# Patient Record
Sex: MALE | Race: OTHER | NOT HISPANIC OR LATINO | Employment: OTHER | URBAN - METROPOLITAN AREA
[De-identification: names, ages, dates, MRNs, and addresses within clinical notes are randomized per-mention and may not be internally consistent; named-entity substitution may affect disease eponyms.]

---

## 2023-03-07 ENCOUNTER — APPOINTMENT (OUTPATIENT)
Dept: LAB | Facility: LAB | Age: 83
End: 2023-03-07
Payer: MEDICARE

## 2023-03-07 LAB
DEAMIDATED GLIADIN PEPTIDE IGA: <1 U/ML (ref 0–14)
DEAMIDATED GLIADIN PEPTIDE IGG: <1 U/ML (ref 0–14)
TISSUE TRANSGLUTAMINASE IGG: <1 U/ML (ref 0–14)
TISSUE TRANSGLUTAMINASE, IGA: <1 U/ML (ref 0–14)

## 2023-03-08 LAB
ANION GAP IN SER/PLAS: 9 MMOL/L (ref 10–20)
CALCIUM (MG/DL) IN SER/PLAS: 8.8 MG/DL (ref 8.6–10.3)
CARBON DIOXIDE, TOTAL (MMOL/L) IN SER/PLAS: 27 MMOL/L (ref 21–32)
CHLORIDE (MMOL/L) IN SER/PLAS: 100 MMOL/L (ref 98–107)
CREATININE (MG/DL) IN SER/PLAS: 1.08 MG/DL (ref 0.5–1.3)
GFR MALE: 68 ML/MIN/1.73M2
GLUCOSE (MG/DL) IN SER/PLAS: 87 MG/DL (ref 74–99)
POTASSIUM (MMOL/L) IN SER/PLAS: 4.3 MMOL/L (ref 3.5–5.3)
SODIUM (MMOL/L) IN SER/PLAS: 132 MMOL/L (ref 136–145)
UREA NITROGEN (MG/DL) IN SER/PLAS: 11 MG/DL (ref 6–23)

## 2023-03-20 LAB
ANION GAP IN SER/PLAS: 10 MMOL/L (ref 10–20)
CALCIUM (MG/DL) IN SER/PLAS: 9.2 MG/DL (ref 8.6–10.3)
CARBON DIOXIDE, TOTAL (MMOL/L) IN SER/PLAS: 26 MMOL/L (ref 21–32)
CHLORIDE (MMOL/L) IN SER/PLAS: 98 MMOL/L (ref 98–107)
CHLORIDE (MOL/L) IN SPOT URINE: 85 MMOL/L
CHLORIDE/CREATININE (MMOL/G) IN URINE: 56 MMOL/G CREAT (ref 23–275)
CORTISOL (UG/DL) IN SERUM - AM: 9.2 UG/DL (ref 5–20)
CREATININE (MG/DL) IN SER/PLAS: 1.06 MG/DL (ref 0.5–1.3)
CREATININE (MG/DL) IN URINE: 152 MG/DL (ref 20–370)
GFR MALE: 70 ML/MIN/1.73M2
GLUCOSE (MG/DL) IN SER/PLAS: 95 MG/DL (ref 74–99)
NATRIURETIC PEPTIDE B (PG/ML) IN SER/PLAS: 107 PG/ML (ref 0–99)
OSMOLALITY, RANDOM URINE: 472 MOSM/KG (ref 200–1200)
POTASSIUM (MMOL/L) IN SER/PLAS: 4 MMOL/L (ref 3.5–5.3)
POTASSIUM URINE RANDOM: 46 MMOL/L
POTASSIUM/CREATININE (MMOL/G) IN URINE: 30 MMOL/G CREAT
SODIUM (MMOL/L) IN SER/PLAS: 130 MMOL/L (ref 136–145)
SODIUM URINE RANDOM: 69 MMOL/L
SODIUM/CREATININE (MMOL/G) IN URINE: 45 MMOL/G CREAT
UREA NITROGEN (MG/DL) IN SER/PLAS: 11 MG/DL (ref 6–23)
UREA NITROGEN, RANDOM URINE: 544 MG/DL
UREA NITROGEN/CREATININE (G/G) URINE: 3.6 G/G CREAT

## 2023-04-19 LAB
ANION GAP IN SER/PLAS: 9 MMOL/L (ref 10–20)
CALCIUM (MG/DL) IN SER/PLAS: 8.9 MG/DL (ref 8.6–10.3)
CARBON DIOXIDE, TOTAL (MMOL/L) IN SER/PLAS: 26 MMOL/L (ref 21–32)
CHLORIDE (MMOL/L) IN SER/PLAS: 101 MMOL/L (ref 98–107)
CREATININE (MG/DL) IN SER/PLAS: 1.01 MG/DL (ref 0.5–1.3)
GFR MALE: 74 ML/MIN/1.73M2
GLUCOSE (MG/DL) IN SER/PLAS: 102 MG/DL (ref 74–99)
POTASSIUM (MMOL/L) IN SER/PLAS: 4.3 MMOL/L (ref 3.5–5.3)
SODIUM (MMOL/L) IN SER/PLAS: 132 MMOL/L (ref 136–145)
UREA NITROGEN (MG/DL) IN SER/PLAS: 12 MG/DL (ref 6–23)

## 2023-05-24 DIAGNOSIS — N39.0 URINARY TRACT INFECTION WITHOUT HEMATURIA, SITE UNSPECIFIED: ICD-10-CM

## 2023-05-25 ENCOUNTER — LAB (OUTPATIENT)
Dept: LAB | Facility: LAB | Age: 83
End: 2023-05-25
Payer: MEDICARE

## 2023-05-25 DIAGNOSIS — N39.0 URINARY TRACT INFECTION WITHOUT HEMATURIA, SITE UNSPECIFIED: ICD-10-CM

## 2023-05-25 LAB
ESTIMATED AVERAGE GLUCOSE FOR HBA1C: 120 MG/DL
HEMOGLOBIN A1C/HEMOGLOBIN TOTAL IN BLOOD: 5.8 %
THYROTROPIN (MIU/L) IN SER/PLAS BY DETECTION LIMIT <= 0.05 MIU/L: 2.62 MIU/L (ref 0.44–3.98)

## 2023-05-25 PROCEDURE — 81003 URINALYSIS AUTO W/O SCOPE: CPT

## 2023-05-26 LAB
APPEARANCE, URINE: CLEAR
BILIRUBIN, URINE: NEGATIVE
BLOOD, URINE: NEGATIVE
COLOR, URINE: ABNORMAL
GLUCOSE, URINE: NEGATIVE MG/DL
KETONES, URINE: NEGATIVE MG/DL
LEUKOCYTE ESTERASE, URINE: NEGATIVE
NITRITE, URINE: NEGATIVE
PH, URINE: 6 (ref 5–8)
PROTEIN, URINE: NEGATIVE MG/DL
SPECIFIC GRAVITY, URINE: 1 (ref 1–1.03)
UROBILINOGEN, URINE: <2 MG/DL (ref 0–1.9)

## 2023-05-26 PROCEDURE — 87086 URINE CULTURE/COLONY COUNT: CPT

## 2023-05-27 LAB — URINE CULTURE: NORMAL

## 2023-05-30 LAB
ANION GAP IN SER/PLAS: 8 MMOL/L (ref 10–20)
CALCIUM (MG/DL) IN SER/PLAS: 9 MG/DL (ref 8.6–10.3)
CARBON DIOXIDE, TOTAL (MMOL/L) IN SER/PLAS: 29 MMOL/L (ref 21–32)
CHLORIDE (MMOL/L) IN SER/PLAS: 100 MMOL/L (ref 98–107)
CREATININE (MG/DL) IN SER/PLAS: 0.98 MG/DL (ref 0.5–1.3)
GFR MALE: 76 ML/MIN/1.73M2
GLUCOSE (MG/DL) IN SER/PLAS: 99 MG/DL (ref 74–99)
POTASSIUM (MMOL/L) IN SER/PLAS: 4.5 MMOL/L (ref 3.5–5.3)
SODIUM (MMOL/L) IN SER/PLAS: 132 MMOL/L (ref 136–145)
UREA NITROGEN (MG/DL) IN SER/PLAS: 12 MG/DL (ref 6–23)

## 2023-06-12 LAB
ANION GAP IN SER/PLAS: 10 MMOL/L (ref 10–20)
CALCIUM (MG/DL) IN SER/PLAS: 8.8 MG/DL (ref 8.6–10.3)
CARBON DIOXIDE, TOTAL (MMOL/L) IN SER/PLAS: 28 MMOL/L (ref 21–32)
CHLORIDE (MMOL/L) IN SER/PLAS: 102 MMOL/L (ref 98–107)
CREATININE (MG/DL) IN SER/PLAS: 1.07 MG/DL (ref 0.5–1.3)
GFR MALE: 69 ML/MIN/1.73M2
GLUCOSE (MG/DL) IN SER/PLAS: 93 MG/DL (ref 74–99)
POTASSIUM (MMOL/L) IN SER/PLAS: 4.5 MMOL/L (ref 3.5–5.3)
SODIUM (MMOL/L) IN SER/PLAS: 135 MMOL/L (ref 136–145)
UREA NITROGEN (MG/DL) IN SER/PLAS: 16 MG/DL (ref 6–23)

## 2023-06-15 DIAGNOSIS — E06.3 HYPOTHYROIDISM DUE TO HASHIMOTO'S THYROIDITIS: Primary | ICD-10-CM

## 2023-06-15 DIAGNOSIS — E03.8 HYPOTHYROIDISM DUE TO HASHIMOTO'S THYROIDITIS: Primary | ICD-10-CM

## 2023-06-16 ENCOUNTER — OFFICE VISIT (OUTPATIENT)
Dept: PRIMARY CARE | Facility: CLINIC | Age: 83
End: 2023-06-16
Payer: MEDICARE

## 2023-06-16 VITALS
SYSTOLIC BLOOD PRESSURE: 130 MMHG | BODY MASS INDEX: 28.63 KG/M2 | OXYGEN SATURATION: 99 % | WEIGHT: 188.27 LBS | DIASTOLIC BLOOD PRESSURE: 64 MMHG | HEART RATE: 54 BPM

## 2023-06-16 DIAGNOSIS — R06.09 DYSPNEA ON EXERTION: ICD-10-CM

## 2023-06-16 DIAGNOSIS — K59.04 CHRONIC IDIOPATHIC CONSTIPATION: ICD-10-CM

## 2023-06-16 DIAGNOSIS — E03.8 HYPOTHYROIDISM DUE TO HASHIMOTO'S THYROIDITIS: ICD-10-CM

## 2023-06-16 DIAGNOSIS — I10 ESSENTIAL HYPERTENSION: Primary | ICD-10-CM

## 2023-06-16 DIAGNOSIS — E06.3 HYPOTHYROIDISM DUE TO HASHIMOTO'S THYROIDITIS: ICD-10-CM

## 2023-06-16 DIAGNOSIS — E87.1 HYPONATREMIA: ICD-10-CM

## 2023-06-16 PROBLEM — E78.5 HYPERLIPIDEMIA: Status: ACTIVE | Noted: 2023-06-16

## 2023-06-16 PROBLEM — K59.00 CONSTIPATION: Status: ACTIVE | Noted: 2023-06-16

## 2023-06-16 PROBLEM — R53.82 CHRONIC FATIGUE: Status: ACTIVE | Noted: 2023-06-16

## 2023-06-16 PROBLEM — I69.322 DYSARTHRIA FOLLOWING CEREBROVASCULAR ACCIDENT: Status: ACTIVE | Noted: 2023-06-16

## 2023-06-16 PROBLEM — J45.909 REACTIVE AIRWAY DISEASE (HHS-HCC): Status: ACTIVE | Noted: 2023-06-16

## 2023-06-16 PROBLEM — R41.3 MEMORY LOSS: Status: ACTIVE | Noted: 2023-06-16

## 2023-06-16 PROBLEM — M54.50 LUMBAR PAIN: Status: ACTIVE | Noted: 2023-06-16

## 2023-06-16 PROBLEM — K21.9 GERD WITHOUT ESOPHAGITIS: Status: ACTIVE | Noted: 2023-06-16

## 2023-06-16 PROBLEM — Z95.1 S/P CABG (CORONARY ARTERY BYPASS GRAFT): Status: ACTIVE | Noted: 2023-06-16

## 2023-06-16 PROBLEM — I63.9 CEREBRAL INFARCTION, UNSPECIFIED (MULTI): Status: ACTIVE | Noted: 2023-06-16

## 2023-06-16 PROBLEM — H49.21 CN VI PALSY, RIGHT EYE: Status: ACTIVE | Noted: 2023-06-16

## 2023-06-16 PROBLEM — I73.9 INTERMITTENT CLAUDICATION (CMS-HCC): Status: ACTIVE | Noted: 2023-06-16

## 2023-06-16 PROCEDURE — 3075F SYST BP GE 130 - 139MM HG: CPT | Performed by: INTERNAL MEDICINE

## 2023-06-16 PROCEDURE — 3078F DIAST BP <80 MM HG: CPT | Performed by: INTERNAL MEDICINE

## 2023-06-16 PROCEDURE — 1036F TOBACCO NON-USER: CPT | Performed by: INTERNAL MEDICINE

## 2023-06-16 PROCEDURE — 99214 OFFICE O/P EST MOD 30 MIN: CPT | Performed by: INTERNAL MEDICINE

## 2023-06-16 NOTE — ASSESSMENT & PLAN NOTE
Symptoms are mild but the patient does have some minimal crackles in his bases so we will obtain a chest x-ray to look for any pulmonary congestion.

## 2023-06-16 NOTE — ASSESSMENT & PLAN NOTE
Unclear etiology for his fatigue.  We will get him into a more aggressive exercise program.  We will assess him after he gets more definitive treatment for his urologic issues and hopefully will get a better night sleep.  We will check a CBC chemistries and thyroid function studies today as well.

## 2023-06-17 ENCOUNTER — LAB (OUTPATIENT)
Dept: LAB | Facility: LAB | Age: 83
End: 2023-06-17
Payer: MEDICARE

## 2023-06-17 DIAGNOSIS — E06.3 HYPOTHYROIDISM DUE TO HASHIMOTO'S THYROIDITIS: ICD-10-CM

## 2023-06-17 DIAGNOSIS — E03.8 HYPOTHYROIDISM DUE TO HASHIMOTO'S THYROIDITIS: ICD-10-CM

## 2023-06-17 LAB
ALANINE AMINOTRANSFERASE (SGPT) (U/L) IN SER/PLAS: 27 U/L (ref 10–52)
ALBUMIN (G/DL) IN SER/PLAS: 4 G/DL (ref 3.4–5)
ALKALINE PHOSPHATASE (U/L) IN SER/PLAS: 112 U/L (ref 33–136)
ANION GAP IN SER/PLAS: 10 MMOL/L (ref 10–20)
ASPARTATE AMINOTRANSFERASE (SGOT) (U/L) IN SER/PLAS: 32 U/L (ref 9–39)
BASOPHILS (10*3/UL) IN BLOOD BY AUTOMATED COUNT: 0.04 X10E9/L (ref 0–0.1)
BASOPHILS/100 LEUKOCYTES IN BLOOD BY AUTOMATED COUNT: 0.7 % (ref 0–2)
BILIRUBIN TOTAL (MG/DL) IN SER/PLAS: 0.9 MG/DL (ref 0–1.2)
CALCIUM (MG/DL) IN SER/PLAS: 9 MG/DL (ref 8.6–10.3)
CARBON DIOXIDE, TOTAL (MMOL/L) IN SER/PLAS: 27 MMOL/L (ref 21–32)
CHLORIDE (MMOL/L) IN SER/PLAS: 101 MMOL/L (ref 98–107)
CHOLESTEROL (MG/DL) IN SER/PLAS: 107 MG/DL (ref 0–199)
CHOLESTEROL IN HDL (MG/DL) IN SER/PLAS: 51.2 MG/DL
CHOLESTEROL/HDL RATIO: 2.1
CREATININE (MG/DL) IN SER/PLAS: 1.05 MG/DL (ref 0.5–1.3)
EOSINOPHILS (10*3/UL) IN BLOOD BY AUTOMATED COUNT: 0.1 X10E9/L (ref 0–0.4)
EOSINOPHILS/100 LEUKOCYTES IN BLOOD BY AUTOMATED COUNT: 1.7 % (ref 0–6)
ERYTHROCYTE DISTRIBUTION WIDTH (RATIO) BY AUTOMATED COUNT: 14.1 % (ref 11.5–14.5)
ERYTHROCYTE MEAN CORPUSCULAR HEMOGLOBIN CONCENTRATION (G/DL) BY AUTOMATED: 31.7 G/DL (ref 32–36)
ERYTHROCYTE MEAN CORPUSCULAR VOLUME (FL) BY AUTOMATED COUNT: 92 FL (ref 80–100)
ERYTHROCYTES (10*6/UL) IN BLOOD BY AUTOMATED COUNT: 4.83 X10E12/L (ref 4.5–5.9)
GFR MALE: 70 ML/MIN/1.73M2
GLUCOSE (MG/DL) IN SER/PLAS: 82 MG/DL (ref 74–99)
HEMATOCRIT (%) IN BLOOD BY AUTOMATED COUNT: 44.5 % (ref 41–52)
HEMOGLOBIN (G/DL) IN BLOOD: 14.1 G/DL (ref 13.5–17.5)
IMMATURE GRANULOCYTES/100 LEUKOCYTES IN BLOOD BY AUTOMATED COUNT: 0.3 % (ref 0–0.9)
LDL: 46 MG/DL (ref 0–99)
LEUKOCYTES (10*3/UL) IN BLOOD BY AUTOMATED COUNT: 5.9 X10E9/L (ref 4.4–11.3)
LYMPHOCYTES (10*3/UL) IN BLOOD BY AUTOMATED COUNT: 1.18 X10E9/L (ref 0.8–3)
LYMPHOCYTES/100 LEUKOCYTES IN BLOOD BY AUTOMATED COUNT: 20 % (ref 13–44)
MONOCYTES (10*3/UL) IN BLOOD BY AUTOMATED COUNT: 0.59 X10E9/L (ref 0.05–0.8)
MONOCYTES/100 LEUKOCYTES IN BLOOD BY AUTOMATED COUNT: 10 % (ref 2–10)
NEUTROPHILS (10*3/UL) IN BLOOD BY AUTOMATED COUNT: 3.98 X10E9/L (ref 1.6–5.5)
NEUTROPHILS/100 LEUKOCYTES IN BLOOD BY AUTOMATED COUNT: 67.3 % (ref 40–80)
PLATELETS (10*3/UL) IN BLOOD AUTOMATED COUNT: 144 X10E9/L (ref 150–450)
POTASSIUM (MMOL/L) IN SER/PLAS: 5 MMOL/L (ref 3.5–5.3)
PROTEIN TOTAL: 7.2 G/DL (ref 6.4–8.2)
SODIUM (MMOL/L) IN SER/PLAS: 133 MMOL/L (ref 136–145)
THYROTROPIN (MIU/L) IN SER/PLAS BY DETECTION LIMIT <= 0.05 MIU/L: 5.92 MIU/L (ref 0.44–3.98)
THYROXINE (T4) FREE (NG/DL) IN SER/PLAS: 0.89 NG/DL (ref 0.61–1.12)
TRIGLYCERIDE (MG/DL) IN SER/PLAS: 47 MG/DL (ref 0–149)
UREA NITROGEN (MG/DL) IN SER/PLAS: 12 MG/DL (ref 6–23)
VLDL: 9 MG/DL (ref 0–40)

## 2023-06-17 PROCEDURE — 80053 COMPREHEN METABOLIC PANEL: CPT

## 2023-06-17 PROCEDURE — 85025 COMPLETE CBC W/AUTO DIFF WBC: CPT

## 2023-06-17 PROCEDURE — 36415 COLL VENOUS BLD VENIPUNCTURE: CPT

## 2023-06-17 PROCEDURE — 84443 ASSAY THYROID STIM HORMONE: CPT

## 2023-06-17 PROCEDURE — 80061 LIPID PANEL: CPT

## 2023-06-17 PROCEDURE — 84439 ASSAY OF FREE THYROXINE: CPT

## 2023-06-19 RX ORDER — CLOPIDOGREL BISULFATE 75 MG/1
1 TABLET ORAL DAILY
COMMUNITY
Start: 2022-06-13 | End: 2023-07-24

## 2023-06-19 RX ORDER — AMLODIPINE BESYLATE 10 MG/1
1 TABLET ORAL DAILY
COMMUNITY
Start: 2018-06-02 | End: 2023-10-18

## 2023-06-19 RX ORDER — ATORVASTATIN CALCIUM 80 MG/1
80 TABLET, FILM COATED ORAL DAILY
COMMUNITY
Start: 2022-06-13

## 2023-06-19 RX ORDER — FAMOTIDINE 20 MG/1
20 TABLET, FILM COATED ORAL DAILY
COMMUNITY

## 2023-06-19 RX ORDER — LEVOTHYROXINE SODIUM 112 UG/1
112 TABLET ORAL
COMMUNITY
Start: 2018-02-09 | End: 2023-06-19 | Stop reason: SDUPTHER

## 2023-06-19 RX ORDER — LEVOTHYROXINE SODIUM 125 UG/1
125 TABLET ORAL
Qty: 30 TABLET | Refills: 11 | Status: SHIPPED | OUTPATIENT
Start: 2023-06-19 | End: 2023-06-22 | Stop reason: SDUPTHER

## 2023-06-19 RX ORDER — LISINOPRIL 10 MG/1
1 TABLET ORAL DAILY
COMMUNITY
Start: 2022-06-13

## 2023-06-19 RX ORDER — MELATONIN 10 MG
1 TABLET, SUBLINGUAL SUBLINGUAL DAILY
COMMUNITY

## 2023-06-19 RX ORDER — SENNOSIDES 8.6 MG/1
CAPSULE, GELATIN COATED ORAL
COMMUNITY

## 2023-06-19 RX ORDER — LANOLIN ALCOHOL/MO/W.PET/CERES
1000 CREAM (GRAM) TOPICAL
COMMUNITY

## 2023-06-19 RX ORDER — SODIUM CHLORIDE 1 G/1
1 TABLET ORAL 2 TIMES DAILY
COMMUNITY
Start: 2022-12-30

## 2023-06-19 NOTE — PROGRESS NOTES
Called regarding TSH levels will increase levothyroxine to 125 mcg daily.  We will also review his chest x-ray with radiology.

## 2023-06-22 DIAGNOSIS — E03.8 HYPOTHYROIDISM DUE TO HASHIMOTO'S THYROIDITIS: ICD-10-CM

## 2023-06-22 DIAGNOSIS — E06.3 HYPOTHYROIDISM DUE TO HASHIMOTO'S THYROIDITIS: ICD-10-CM

## 2023-06-22 RX ORDER — LEVOTHYROXINE SODIUM 125 UG/1
125 TABLET ORAL
Qty: 30 TABLET | Refills: 11 | Status: SHIPPED | OUTPATIENT
Start: 2023-06-22 | End: 2023-06-23 | Stop reason: SINTOL

## 2023-06-23 RX ORDER — LEVOTHYROXINE SODIUM 125 UG/1
125 TABLET ORAL DAILY
Qty: 30 TABLET | Refills: 11 | Status: SHIPPED | OUTPATIENT
Start: 2023-06-23 | End: 2023-07-18

## 2023-06-30 DIAGNOSIS — E03.9 HYPOTHYROIDISM, UNSPECIFIED: ICD-10-CM

## 2023-06-30 RX ORDER — LEVOTHYROXINE SODIUM 100 UG/1
TABLET ORAL
Qty: 90 TABLET | Refills: 3 | Status: SHIPPED | OUTPATIENT
Start: 2023-06-30 | End: 2023-11-14 | Stop reason: WASHOUT

## 2023-07-03 LAB
ANION GAP IN SER/PLAS: 9 MMOL/L (ref 10–20)
CALCIUM (MG/DL) IN SER/PLAS: 8.5 MG/DL (ref 8.6–10.3)
CARBON DIOXIDE, TOTAL (MMOL/L) IN SER/PLAS: 26 MMOL/L (ref 21–32)
CHLORIDE (MMOL/L) IN SER/PLAS: 99 MMOL/L (ref 98–107)
CREATININE (MG/DL) IN SER/PLAS: 1.08 MG/DL (ref 0.5–1.3)
GFR MALE: 68 ML/MIN/1.73M2
GLUCOSE (MG/DL) IN SER/PLAS: 114 MG/DL (ref 74–99)
POTASSIUM (MMOL/L) IN SER/PLAS: 4.4 MMOL/L (ref 3.5–5.3)
SODIUM (MMOL/L) IN SER/PLAS: 130 MMOL/L (ref 136–145)
THYROTROPIN (MIU/L) IN SER/PLAS BY DETECTION LIMIT <= 0.05 MIU/L: 1.35 MIU/L (ref 0.44–3.98)
UREA NITROGEN (MG/DL) IN SER/PLAS: 10 MG/DL (ref 6–23)

## 2023-07-18 DIAGNOSIS — E06.3 HYPOTHYROIDISM DUE TO HASHIMOTO'S THYROIDITIS: ICD-10-CM

## 2023-07-18 DIAGNOSIS — E03.8 HYPOTHYROIDISM DUE TO HASHIMOTO'S THYROIDITIS: ICD-10-CM

## 2023-07-18 RX ORDER — LEVOTHYROXINE SODIUM 125 UG/1
TABLET ORAL
Qty: 30 TABLET | Refills: 11 | Status: SHIPPED | OUTPATIENT
Start: 2023-07-18 | End: 2023-11-14 | Stop reason: SDUPTHER

## 2023-07-24 ENCOUNTER — TELEPHONE (OUTPATIENT)
Dept: PRIMARY CARE | Facility: CLINIC | Age: 83
End: 2023-07-24
Payer: MEDICARE

## 2023-07-24 DIAGNOSIS — I63.9 CEREBRAL INFARCTION, UNSPECIFIED (MULTI): ICD-10-CM

## 2023-07-24 RX ORDER — CLOPIDOGREL BISULFATE 75 MG/1
75 TABLET ORAL DAILY
Qty: 90 TABLET | Refills: 3 | Status: SHIPPED | OUTPATIENT
Start: 2023-07-24

## 2023-09-13 DIAGNOSIS — I10 ESSENTIAL (PRIMARY) HYPERTENSION: ICD-10-CM

## 2023-09-13 RX ORDER — ATORVASTATIN CALCIUM 40 MG/1
40 TABLET, FILM COATED ORAL NIGHTLY
Qty: 90 TABLET | Refills: 3 | Status: SHIPPED | OUTPATIENT
Start: 2023-09-13

## 2023-10-18 DIAGNOSIS — I10 ESSENTIAL (PRIMARY) HYPERTENSION: ICD-10-CM

## 2023-10-18 RX ORDER — AMLODIPINE BESYLATE 10 MG/1
10 TABLET ORAL DAILY
Qty: 90 TABLET | Refills: 3 | Status: SHIPPED | OUTPATIENT
Start: 2023-10-18

## 2023-11-08 DIAGNOSIS — E87.1 HYPONATREMIA: ICD-10-CM

## 2023-11-08 DIAGNOSIS — E03.8 HYPOTHYROIDISM DUE TO HASHIMOTO'S THYROIDITIS: Primary | ICD-10-CM

## 2023-11-08 DIAGNOSIS — E06.3 HYPOTHYROIDISM DUE TO HASHIMOTO'S THYROIDITIS: Primary | ICD-10-CM

## 2023-11-13 ENCOUNTER — LAB (OUTPATIENT)
Dept: LAB | Facility: LAB | Age: 83
End: 2023-11-13
Payer: MEDICARE

## 2023-11-13 DIAGNOSIS — E03.8 HYPOTHYROIDISM DUE TO HASHIMOTO'S THYROIDITIS: ICD-10-CM

## 2023-11-13 DIAGNOSIS — E87.1 HYPONATREMIA: ICD-10-CM

## 2023-11-13 DIAGNOSIS — E06.3 HYPOTHYROIDISM DUE TO HASHIMOTO'S THYROIDITIS: ICD-10-CM

## 2023-11-13 LAB
ALBUMIN SERPL BCP-MCNC: 4.1 G/DL (ref 3.4–5)
ALP SERPL-CCNC: 98 U/L (ref 33–136)
ALT SERPL W P-5'-P-CCNC: 28 U/L (ref 10–52)
ANION GAP SERPL CALC-SCNC: 10 MMOL/L (ref 10–20)
AST SERPL W P-5'-P-CCNC: 31 U/L (ref 9–39)
BASOPHILS # BLD AUTO: 0.04 X10*3/UL (ref 0–0.1)
BASOPHILS NFR BLD AUTO: 0.7 %
BILIRUB SERPL-MCNC: 0.7 MG/DL (ref 0–1.2)
BUN SERPL-MCNC: 15 MG/DL (ref 6–23)
CALCIUM SERPL-MCNC: 9 MG/DL (ref 8.6–10.3)
CHLORIDE SERPL-SCNC: 102 MMOL/L (ref 98–107)
CO2 SERPL-SCNC: 26 MMOL/L (ref 21–32)
CREAT SERPL-MCNC: 1.04 MG/DL (ref 0.5–1.3)
EOSINOPHIL # BLD AUTO: 0.11 X10*3/UL (ref 0–0.4)
EOSINOPHIL NFR BLD AUTO: 1.9 %
ERYTHROCYTE [DISTWIDTH] IN BLOOD BY AUTOMATED COUNT: 13.8 % (ref 11.5–14.5)
GFR SERPL CREATININE-BSD FRML MDRD: 71 ML/MIN/1.73M*2
GLUCOSE SERPL-MCNC: 93 MG/DL (ref 74–99)
HCT VFR BLD AUTO: 42.9 % (ref 41–52)
HGB BLD-MCNC: 13.9 G/DL (ref 13.5–17.5)
IMM GRANULOCYTES # BLD AUTO: 0.02 X10*3/UL (ref 0–0.5)
IMM GRANULOCYTES NFR BLD AUTO: 0.3 % (ref 0–0.9)
LYMPHOCYTES # BLD AUTO: 1.09 X10*3/UL (ref 0.8–3)
LYMPHOCYTES NFR BLD AUTO: 18.9 %
MCH RBC QN AUTO: 30.1 PG (ref 26–34)
MCHC RBC AUTO-ENTMCNC: 32.4 G/DL (ref 32–36)
MCV RBC AUTO: 93 FL (ref 80–100)
MONOCYTES # BLD AUTO: 0.64 X10*3/UL (ref 0.05–0.8)
MONOCYTES NFR BLD AUTO: 11.1 %
NEUTROPHILS # BLD AUTO: 3.87 X10*3/UL (ref 1.6–5.5)
NEUTROPHILS NFR BLD AUTO: 67.1 %
NRBC BLD-RTO: 0 /100 WBCS (ref 0–0)
PLATELET # BLD AUTO: 122 X10*3/UL (ref 150–450)
POTASSIUM SERPL-SCNC: 4.5 MMOL/L (ref 3.5–5.3)
PROT SERPL-MCNC: 6.9 G/DL (ref 6.4–8.2)
RBC # BLD AUTO: 4.62 X10*6/UL (ref 4.5–5.9)
SODIUM SERPL-SCNC: 133 MMOL/L (ref 136–145)
T4 FREE SERPL-MCNC: 1.2 NG/DL (ref 0.61–1.12)
TSH SERPL-ACNC: 0.43 MIU/L (ref 0.44–3.98)
WBC # BLD AUTO: 5.8 X10*3/UL (ref 4.4–11.3)

## 2023-11-13 PROCEDURE — 84439 ASSAY OF FREE THYROXINE: CPT

## 2023-11-13 PROCEDURE — 85025 COMPLETE CBC W/AUTO DIFF WBC: CPT

## 2023-11-13 PROCEDURE — 84443 ASSAY THYROID STIM HORMONE: CPT

## 2023-11-13 PROCEDURE — 80053 COMPREHEN METABOLIC PANEL: CPT

## 2023-11-13 PROCEDURE — 36415 COLL VENOUS BLD VENIPUNCTURE: CPT

## 2023-11-14 RX ORDER — LEVOTHYROXINE SODIUM 125 UG/1
TABLET ORAL
Qty: 30 TABLET | Refills: 11 | Status: SHIPPED | OUTPATIENT
Start: 2023-11-14 | End: 2024-04-15 | Stop reason: ALTCHOICE

## 2023-12-21 ENCOUNTER — LAB (OUTPATIENT)
Dept: LAB | Facility: LAB | Age: 83
End: 2023-12-21
Payer: MEDICARE

## 2023-12-21 DIAGNOSIS — E06.3 HYPOTHYROIDISM DUE TO HASHIMOTO'S THYROIDITIS: ICD-10-CM

## 2023-12-21 DIAGNOSIS — E87.1 HYPONATREMIA: ICD-10-CM

## 2023-12-21 DIAGNOSIS — E03.8 HYPOTHYROIDISM DUE TO HASHIMOTO'S THYROIDITIS: ICD-10-CM

## 2023-12-21 DIAGNOSIS — E87.1 HYPONATREMIA: Primary | ICD-10-CM

## 2023-12-21 DIAGNOSIS — R97.20 PSA ELEVATION: ICD-10-CM

## 2023-12-21 LAB
ANION GAP SERPL CALC-SCNC: 10 MMOL/L (ref 10–20)
BUN SERPL-MCNC: 14 MG/DL (ref 6–23)
CALCIUM SERPL-MCNC: 8.7 MG/DL (ref 8.6–10.3)
CHLORIDE SERPL-SCNC: 100 MMOL/L (ref 98–107)
CO2 SERPL-SCNC: 26 MMOL/L (ref 21–32)
CREAT SERPL-MCNC: 1.03 MG/DL (ref 0.5–1.3)
GFR SERPL CREATININE-BSD FRML MDRD: 72 ML/MIN/1.73M*2
GLUCOSE SERPL-MCNC: 91 MG/DL (ref 74–99)
POTASSIUM SERPL-SCNC: 4.4 MMOL/L (ref 3.5–5.3)
PSA SERPL-MCNC: 0.96 NG/ML
SODIUM SERPL-SCNC: 132 MMOL/L (ref 136–145)
TSH SERPL-ACNC: 0.72 MIU/L (ref 0.44–3.98)

## 2023-12-21 PROCEDURE — 80048 BASIC METABOLIC PNL TOTAL CA: CPT

## 2023-12-21 PROCEDURE — 36415 COLL VENOUS BLD VENIPUNCTURE: CPT

## 2023-12-21 PROCEDURE — 84443 ASSAY THYROID STIM HORMONE: CPT

## 2023-12-21 PROCEDURE — 84153 ASSAY OF PSA TOTAL: CPT

## 2024-01-10 DIAGNOSIS — N13.8 OTHER OBSTRUCTIVE AND REFLUX UROPATHY: ICD-10-CM

## 2024-01-10 DIAGNOSIS — N40.1 BENIGN PROSTATIC HYPERPLASIA WITH LOWER URINARY TRACT SYMPTOMS: ICD-10-CM

## 2024-01-11 RX ORDER — FINASTERIDE 1 MG/1
1 TABLET, FILM COATED ORAL DAILY
Qty: 90 TABLET | Refills: 1 | Status: SHIPPED | OUTPATIENT
Start: 2024-01-11 | End: 2024-03-27 | Stop reason: WASHOUT

## 2024-02-14 PROBLEM — N13.8 ENLARGED PROSTATE WITH URINARY OBSTRUCTION: Status: ACTIVE | Noted: 2024-02-14

## 2024-02-14 PROBLEM — R39.14 BENIGN PROSTATIC HYPERPLASIA WITH INCOMPLETE BLADDER EMPTYING: Status: ACTIVE | Noted: 2024-02-14

## 2024-02-14 PROBLEM — N40.1 BENIGN PROSTATIC HYPERPLASIA WITH INCOMPLETE BLADDER EMPTYING: Status: ACTIVE | Noted: 2024-02-14

## 2024-02-14 PROBLEM — N40.1 ENLARGED PROSTATE WITH URINARY OBSTRUCTION: Status: ACTIVE | Noted: 2024-02-14

## 2024-02-14 NOTE — PROGRESS NOTES
UROLOGIC FOLLOW-UP VISIT     PROBLEM LIST:  1. Benign prostatic hyperplasia with incomplete bladder emptying  Measure post void residual      2. Enlarged prostate with urinary obstruction             HISTORY OF PRESENT ILLNESS:   82-year-old male presents today for evaluation of lower urinary tract symptoms.  He uses the bathroom every 3-4 hours during the day. He gets up about 5-6 times per night and this is bothersome to him. He experiences weak urinary stream. He had one small episode of gross hematuria about 1 weeks ago.  He denies any urinary urgency, frequency,incontinence episodes, incomplete emptying, straining to urinate, dribbling, hematuria and dysuria. His PVR today was cc. He has taken Flomax 0.4mg his lower urinary tract symptoms but has stopped this medication due to feeling dizzy while on the medication. He was started on Finasteride.  He drinks about 2-3 glasses of water or Gatorade per day. His most recent PSA was 0.96 on 12/21/2023. He denies has a family history of cancer. He does not have a history of cancer.He denies any recent weight loss or bone pain. He states his new onset of fatigue started about 1-2 months ago.    PAST MEDICAL HISTORY:  Past Medical History:   Diagnosis Date    Other specified health status     No pertinent past surgical history    Personal history of other diseases of the circulatory system 06/20/2018    History of coronary artery disease       PAST SURGICAL HISTORY:  Past Surgical History:   Procedure Laterality Date    CORONARY ARTERY BYPASS GRAFT  06/20/2018    CABG    ELBOW SURGERY  06/20/2018    Elbow Surgery    MR HEAD ANGIO WO IV CONTRAST  6/12/2022    MR HEAD ANGIO WO IV CONTRAST 6/12/2022 [facility]    MR NECK ANGIO WO IV CONTRAST  6/12/2022    MR NECK ANGIO WO IV CONTRAST 6/12/2022 [facility]        ALLERGIES:   Not on File     MEDICATIONS:     Current Outpatient Medications:     amLODIPine (Norvasc) 10 mg tablet, TAKE 1 TABLET BY MOUTH  EVERY DAY, Disp: 90 tablet, Rfl: 3    aspirin-calcium carbonate 81 mg-300 mg calcium(777 mg) tablet, Take 1 tablet by mouth once daily., Disp: , Rfl:     atorvastatin (Lipitor) 40 mg tablet, TAKE 1 TABLET BY MOUTH EVERYDAY AT BEDTIME, Disp: 90 tablet, Rfl: 3    atorvastatin (Lipitor) 80 mg tablet, Take 1 tablet (80 mg) by mouth once daily., Disp: , Rfl:     cholecalciferol, vitamin D3, 250 mcg (10,000 unit) tablet, Take 1 tablet (250 mcg) by mouth once daily., Disp: , Rfl:     clopidogrel (Plavix) 75 mg tablet, TAKE 1 TABLET BY MOUTH EVERY DAY, Disp: 90 tablet, Rfl: 3    cyanocobalamin (Vitamin B-12) 1,000 mcg tablet, Take 1 tablet (1,000 mcg) by mouth once daily., Disp: , Rfl:     famotidine (Pepcid) 20 mg tablet, Take 1 tablet (20 mg) by mouth once daily., Disp: , Rfl:     finasteride (Propecia) 1 mg tablet, TAKE 1 TABLET DAILY, Disp: 90 tablet, Rfl: 1    lisinopril 10 mg tablet, Take 1 tablet (10 mg) by mouth once daily., Disp: , Rfl:     sennosides (senna) 8.6 mg capsule, Take by mouth., Disp: , Rfl:     sodium chloride 1,000 mg tablet, Take 1 tablet (1 g) by mouth twice a day., Disp: , Rfl:     Synthroid 125 mcg tablet, Take one tablet daily six days a week, Disp: 30 tablet, Rfl: 11      SOCIAL HISTORY:  Patient  reports that he has never smoked. He has never used smokeless tobacco. He reports that he does not drink alcohol and does not use drugs.   Social History     Socioeconomic History    Marital status:      Spouse name: Not on file    Number of children: Not on file    Years of education: Not on file    Highest education level: Not on file   Occupational History    Not on file   Tobacco Use    Smoking status: Never    Smokeless tobacco: Never   Substance and Sexual Activity    Alcohol use: Never    Drug use: Never    Sexual activity: Not on file   Other Topics Concern    Not on file   Social History Narrative    Not on file     Social Determinants of Health     Financial Resource Strain: Not on  "file   Food Insecurity: Not on file   Transportation Needs: Not on file   Physical Activity: Not on file   Stress: Not on file   Social Connections: Not on file   Intimate Partner Violence: Not on file   Housing Stability: Not on file       FAMILY HISTORY:  No family history on file.    REVIEW OF SYSTEMS:***  Constitutional: Negative for fever and chills. Denies anorexia, weight loss.  Eyes: Negative for visual disturbance.   Respiratory: Negative for shortness of breath.    Cardiovascular: Negative for chest pain.   Gastrointestinal: Negative for nausea and vomiting.   Genitourinary: See interval history above.  Skin: Negative for rash.   Neurological: Negative for dizziness and numbness.   Psychiatric/Behavioral: Negative for confusion and decreased concentration.     PHYSICAL EXAM:  There were no vitals taken for this visit.  Constitutional: Patient appears well-developed and well-nourished. No distress.    Head: Normocephalic and atraumatic.    Neck: Normal range of motion.    Cardiovascular: Normal rate.    Pulmonary/Chest: Effort normal. No respiratory distress.   Abdominal: ***  : ***  Musculoskeletal: Normal range of motion.    Neurological: Alert and oriented to person, place, and time.  Psychiatric: Normal mood and affect. Behavior is normal. Thought content normal.      PATHOLOGY REVIEW:  ***    RADIOLOGY REVIEW:  [unfilled]    LABORATORY REVIEW:     Lab Results   Component Value Date    BUN 14 12/21/2023    CREATININE 1.03 12/21/2023    EGFR 72 12/21/2023     (L) 12/21/2023    K 4.4 12/21/2023     12/21/2023    CO2 26 12/21/2023    CALCIUM 8.7 12/21/2023      Lab Results   Component Value Date    WBC 5.8 11/13/2023    RBC 4.62 11/13/2023    HGB 13.9 11/13/2023    HCT 42.9 11/13/2023    MCV 93 11/13/2023    MCH 30.1 11/13/2023    MCHC 32.4 11/13/2023    RDW 13.8 11/13/2023     (L) 11/13/2023        No results found for: \"PSA\"     *** Urine dipstick shows {ua dip:206534}.  Micro " exam: {urine micro:443162}.       Assessment:      1. Benign prostatic hyperplasia with incomplete bladder emptying  Measure post void residual      2. Enlarged prostate with urinary obstruction            Feliciasung Ren is a 83 y.o. male with ***     Plan:    ***    * _ minutes total spent on patient's care today; >50% time spent on counseling/coordination of care

## 2024-02-19 ENCOUNTER — APPOINTMENT (OUTPATIENT)
Dept: UROLOGY | Facility: HOSPITAL | Age: 84
End: 2024-02-19
Payer: MEDICARE

## 2024-02-29 NOTE — PROGRESS NOTES
UROLOGIC FOLLOW-UP VISIT     PROBLEM LIST:  1. Benign prostatic hyperplasia, unspecified whether lower urinary tract symptoms present  Measure post void residual      2. Hyperplasia of prostate with lower urinary tract symptoms (LUTS)             HISTORY OF PRESENT ILLNESS:   84 y/o male who presents today for follow-up on his lower urinary tract symptoms.  He uses the bathroom every 3-4 hours during the day. He gets up about 5-6 times per night and this is bothersome to him. He experiences weak urinary stream.     He denies any urinary urgency, frequency,incontinence episodes, incomplete emptying, straining to urinate, dribbling, hematuria and dysuria. He was started on Finasteride on 6/19/2023 with no improvement in urinary sx.      He drinks about 2-3 glasses of water or Gatorade per day. His most recent PSA was 0.96 on 12/21/2023. He denies has a family history of cancer. He does not have a history of cancer.He denies any recent weight loss or bone pain.     PAST MEDICAL HISTORY:  Past Medical History:   Diagnosis Date    Other specified health status     No pertinent past surgical history    Personal history of other diseases of the circulatory system 06/20/2018    History of coronary artery disease       PAST SURGICAL HISTORY:  Past Surgical History:   Procedure Laterality Date    CORONARY ARTERY BYPASS GRAFT  06/20/2018    CABG    ELBOW SURGERY  06/20/2018    Elbow Surgery    MR HEAD ANGIO WO IV CONTRAST  6/12/2022    MR HEAD ANGIO WO IV CONTRAST 6/12/2022 [facility]    MR NECK ANGIO WO IV CONTRAST  6/12/2022    MR NECK ANGIO WO IV CONTRAST 6/12/2022 [facility]        ALLERGIES:   Not on File     MEDICATIONS:     Current Outpatient Medications:     amLODIPine (Norvasc) 10 mg tablet, TAKE 1 TABLET BY MOUTH EVERY DAY, Disp: 90 tablet, Rfl: 3    aspirin-calcium carbonate 81 mg-300 mg calcium(777 mg) tablet, Take 1 tablet by mouth once daily., Disp: , Rfl:     atorvastatin (Lipitor) 40 mg  tablet, TAKE 1 TABLET BY MOUTH EVERYDAY AT BEDTIME, Disp: 90 tablet, Rfl: 3    atorvastatin (Lipitor) 80 mg tablet, Take 1 tablet (80 mg) by mouth once daily., Disp: , Rfl:     cholecalciferol, vitamin D3, 250 mcg (10,000 unit) tablet, Take 1 tablet (250 mcg) by mouth once daily., Disp: , Rfl:     clopidogrel (Plavix) 75 mg tablet, TAKE 1 TABLET BY MOUTH EVERY DAY, Disp: 90 tablet, Rfl: 3    cyanocobalamin (Vitamin B-12) 1,000 mcg tablet, Take 1 tablet (1,000 mcg) by mouth once daily., Disp: , Rfl:     famotidine (Pepcid) 20 mg tablet, Take 1 tablet (20 mg) by mouth once daily., Disp: , Rfl:     finasteride (Propecia) 1 mg tablet, TAKE 1 TABLET DAILY, Disp: 90 tablet, Rfl: 1    lisinopril 10 mg tablet, Take 1 tablet (10 mg) by mouth once daily., Disp: , Rfl:     sennosides (senna) 8.6 mg capsule, Take by mouth., Disp: , Rfl:     sodium chloride 1,000 mg tablet, Take 1 tablet (1 g) by mouth twice a day., Disp: , Rfl:     Synthroid 125 mcg tablet, Take one tablet daily six days a week, Disp: 30 tablet, Rfl: 11      SOCIAL HISTORY:  Patient  reports that he has never smoked. He has never used smokeless tobacco. He reports that he does not drink alcohol and does not use drugs.   Social History     Socioeconomic History    Marital status:      Spouse name: Not on file    Number of children: Not on file    Years of education: Not on file    Highest education level: Not on file   Occupational History    Not on file   Tobacco Use    Smoking status: Never    Smokeless tobacco: Never   Substance and Sexual Activity    Alcohol use: Never    Drug use: Never    Sexual activity: Not on file   Other Topics Concern    Not on file   Social History Narrative    Not on file     Social Determinants of Health     Financial Resource Strain: Not on file   Food Insecurity: Not on file   Transportation Needs: Not on file   Physical Activity: Not on file   Stress: Not on file   Social Connections: Not on file   Intimate Partner  Violence: Not on file   Housing Stability: Not on file       FAMILY HISTORY:  No family history on file.    REVIEW OF SYSTEMS:  Constitutional: Negative for fever and chills. Denies anorexia, weight loss.  Eyes: Negative for visual disturbance.   Respiratory: Negative for shortness of breath.    Cardiovascular: Negative for chest pain.   Gastrointestinal: Negative for nausea and vomiting.   Genitourinary: See interval history above.  Skin: Negative for rash.   Neurological: Negative for dizziness and numbness.   Psychiatric/Behavioral: Negative for confusion and decreased concentration.     PHYSICAL EXAM:  There were no vitals taken for this visit.  Constitutional: Patient appears well-developed and well-nourished. No distress.    Head: Normocephalic and atraumatic.    Neck: Normal range of motion.    Cardiovascular: Normal rate.    Pulmonary/Chest: Effort normal. No respiratory distress.   Abdominal: soft NTND  Musculoskeletal: Normal range of motion.    Neurological: Alert and oriented to person, place, and time.  Psychiatric: Normal mood and affect. Behavior is normal. Thought content normal.      LABORATORY REVIEW:     Lab Results   Component Value Date    BUN 14 12/21/2023    CREATININE 1.03 12/21/2023    EGFR 72 12/21/2023     (L) 12/21/2023    K 4.4 12/21/2023     12/21/2023    CO2 26 12/21/2023    CALCIUM 8.7 12/21/2023      Lab Results   Component Value Date    WBC 5.8 11/13/2023    RBC 4.62 11/13/2023    HGB 13.9 11/13/2023    HCT 42.9 11/13/2023    MCV 93 11/13/2023    MCH 30.1 11/13/2023    MCHC 32.4 11/13/2023    RDW 13.8 11/13/2023     (L) 11/13/2023          Assessment:      1. Benign prostatic hyperplasia, unspecified whether lower urinary tract symptoms present  Measure post void residual      2. Hyperplasia of prostate with lower urinary tract symptoms (LUTS)           Plan:    Today again reviewed the MOA of finasteride   Counseled patient on the differential procedures and  surgeries used to treat BPH w/ LUTS   We discussed in details PEA vs. HoLEP and information on both were provided    Pt would like to review information given and follow-up after    30 minutes total spent on patient's care today; >50% time spent on counseling/coordination of care

## 2024-03-04 ENCOUNTER — OFFICE VISIT (OUTPATIENT)
Dept: UROLOGY | Facility: HOSPITAL | Age: 84
End: 2024-03-04
Payer: MEDICARE

## 2024-03-04 DIAGNOSIS — N40.1 HYPERPLASIA OF PROSTATE WITH LOWER URINARY TRACT SYMPTOMS (LUTS): ICD-10-CM

## 2024-03-04 DIAGNOSIS — N40.0 BENIGN PROSTATIC HYPERPLASIA, UNSPECIFIED WHETHER LOWER URINARY TRACT SYMPTOMS PRESENT: Primary | ICD-10-CM

## 2024-03-04 PROCEDURE — 99214 OFFICE O/P EST MOD 30 MIN: CPT | Performed by: STUDENT IN AN ORGANIZED HEALTH CARE EDUCATION/TRAINING PROGRAM

## 2024-03-04 PROCEDURE — 1036F TOBACCO NON-USER: CPT | Performed by: STUDENT IN AN ORGANIZED HEALTH CARE EDUCATION/TRAINING PROGRAM

## 2024-03-06 RX ORDER — FINASTERIDE 5 MG/1
5 TABLET, FILM COATED ORAL DAILY
Qty: 30 TABLET | Refills: 11 | Status: SHIPPED | OUTPATIENT
Start: 2024-03-06 | End: 2025-03-06

## 2024-03-27 ENCOUNTER — OFFICE VISIT (OUTPATIENT)
Dept: PRIMARY CARE | Facility: CLINIC | Age: 84
End: 2024-03-27
Payer: MEDICARE

## 2024-03-27 VITALS
BODY MASS INDEX: 28.7 KG/M2 | DIASTOLIC BLOOD PRESSURE: 64 MMHG | OXYGEN SATURATION: 98 % | SYSTOLIC BLOOD PRESSURE: 122 MMHG | WEIGHT: 186 LBS | HEART RATE: 62 BPM

## 2024-03-27 DIAGNOSIS — N40.1 BENIGN PROSTATIC HYPERPLASIA (BPH) WITH STRAINING ON URINATION: ICD-10-CM

## 2024-03-27 DIAGNOSIS — E06.3 HYPOTHYROIDISM DUE TO HASHIMOTO'S THYROIDITIS: ICD-10-CM

## 2024-03-27 DIAGNOSIS — R41.3 MEMORY LOSS: ICD-10-CM

## 2024-03-27 DIAGNOSIS — R39.16 BENIGN PROSTATIC HYPERPLASIA (BPH) WITH STRAINING ON URINATION: ICD-10-CM

## 2024-03-27 DIAGNOSIS — E87.1 HYPONATREMIA: Primary | ICD-10-CM

## 2024-03-27 DIAGNOSIS — E03.8 HYPOTHYROIDISM DUE TO HASHIMOTO'S THYROIDITIS: ICD-10-CM

## 2024-03-27 PROCEDURE — 99213 OFFICE O/P EST LOW 20 MIN: CPT | Performed by: INTERNAL MEDICINE

## 2024-03-27 PROCEDURE — 3074F SYST BP LT 130 MM HG: CPT | Performed by: INTERNAL MEDICINE

## 2024-03-27 PROCEDURE — 3078F DIAST BP <80 MM HG: CPT | Performed by: INTERNAL MEDICINE

## 2024-03-27 NOTE — PROGRESS NOTES
Patient is here to discuss his upcoming prostate intervention.  Patient has been following up with urology and was started on finasteride.  He unfortunately took the 1 mg dose instead of the 5 mg dose that was prescribed.  He reviewed his options with Dr. Contreras and has decided he would like to pursue a prostatic artery embolization.  We talked about the pros and cons of this procedure.  I did encourage him to proceed in that direction.  Otherwise he feels quite well he is still being fairly noncompliant with his diet.  He has not had a TSH done since we adjusted his levothyroxine dose a few months ago.  He also needs follow-up on his hyponatremia.    Cardiac and pulm exams are unchanged today    I recommended a prostatic embolization procedure for him he is a consult to Dr. Lynn was placed.  He will follow-up with me in 3 months.  Will check fasting chemistries and a TSH today as well.

## 2024-03-28 ENCOUNTER — LAB (OUTPATIENT)
Dept: LAB | Facility: LAB | Age: 84
End: 2024-03-28
Payer: MEDICARE

## 2024-03-28 DIAGNOSIS — E03.8 HYPOTHYROIDISM DUE TO HASHIMOTO'S THYROIDITIS: ICD-10-CM

## 2024-03-28 DIAGNOSIS — E06.3 HYPOTHYROIDISM DUE TO HASHIMOTO'S THYROIDITIS: ICD-10-CM

## 2024-03-28 DIAGNOSIS — E87.1 HYPONATREMIA: ICD-10-CM

## 2024-03-28 LAB
ALBUMIN SERPL BCP-MCNC: 4.2 G/DL (ref 3.4–5)
ALP SERPL-CCNC: 99 U/L (ref 33–136)
ALT SERPL W P-5'-P-CCNC: 28 U/L (ref 10–52)
ANION GAP SERPL CALC-SCNC: 10 MMOL/L (ref 10–20)
AST SERPL W P-5'-P-CCNC: 31 U/L (ref 9–39)
BASOPHILS # BLD AUTO: 0.02 X10*3/UL (ref 0–0.1)
BASOPHILS NFR BLD AUTO: 0.4 %
BILIRUB SERPL-MCNC: 0.7 MG/DL (ref 0–1.2)
BUN SERPL-MCNC: 17 MG/DL (ref 6–23)
CALCIUM SERPL-MCNC: 8.8 MG/DL (ref 8.6–10.3)
CHLORIDE SERPL-SCNC: 99 MMOL/L (ref 98–107)
CO2 SERPL-SCNC: 26 MMOL/L (ref 21–32)
CREAT SERPL-MCNC: 1.11 MG/DL (ref 0.5–1.3)
EGFRCR SERPLBLD CKD-EPI 2021: 65 ML/MIN/1.73M*2
EOSINOPHIL # BLD AUTO: 0.1 X10*3/UL (ref 0–0.4)
EOSINOPHIL NFR BLD AUTO: 1.8 %
ERYTHROCYTE [DISTWIDTH] IN BLOOD BY AUTOMATED COUNT: 13.6 % (ref 11.5–14.5)
GLUCOSE SERPL-MCNC: 98 MG/DL (ref 74–99)
HCT VFR BLD AUTO: 44.8 % (ref 41–52)
HGB BLD-MCNC: 14.7 G/DL (ref 13.5–17.5)
IMM GRANULOCYTES # BLD AUTO: 0.02 X10*3/UL (ref 0–0.5)
IMM GRANULOCYTES NFR BLD AUTO: 0.4 % (ref 0–0.9)
LYMPHOCYTES # BLD AUTO: 1.05 X10*3/UL (ref 0.8–3)
LYMPHOCYTES NFR BLD AUTO: 19.4 %
MCH RBC QN AUTO: 30.4 PG (ref 26–34)
MCHC RBC AUTO-ENTMCNC: 32.8 G/DL (ref 32–36)
MCV RBC AUTO: 93 FL (ref 80–100)
MONOCYTES # BLD AUTO: 0.62 X10*3/UL (ref 0.05–0.8)
MONOCYTES NFR BLD AUTO: 11.4 %
NEUTROPHILS # BLD AUTO: 3.61 X10*3/UL (ref 1.6–5.5)
NEUTROPHILS NFR BLD AUTO: 66.6 %
NRBC BLD-RTO: 0 /100 WBCS (ref 0–0)
PLATELET # BLD AUTO: 151 X10*3/UL (ref 150–450)
POTASSIUM SERPL-SCNC: 4.2 MMOL/L (ref 3.5–5.3)
PROT SERPL-MCNC: 7 G/DL (ref 6.4–8.2)
RBC # BLD AUTO: 4.83 X10*6/UL (ref 4.5–5.9)
SODIUM SERPL-SCNC: 131 MMOL/L (ref 136–145)
TSH SERPL-ACNC: 2.01 MIU/L (ref 0.44–3.98)
WBC # BLD AUTO: 5.4 X10*3/UL (ref 4.4–11.3)

## 2024-03-28 PROCEDURE — 84443 ASSAY THYROID STIM HORMONE: CPT

## 2024-03-28 PROCEDURE — 80053 COMPREHEN METABOLIC PANEL: CPT

## 2024-03-28 PROCEDURE — 36415 COLL VENOUS BLD VENIPUNCTURE: CPT

## 2024-03-28 PROCEDURE — 85025 COMPLETE CBC W/AUTO DIFF WBC: CPT

## 2024-04-15 DIAGNOSIS — E06.3 HYPOTHYROIDISM DUE TO HASHIMOTO'S THYROIDITIS: ICD-10-CM

## 2024-04-15 DIAGNOSIS — E03.8 HYPOTHYROIDISM DUE TO HASHIMOTO'S THYROIDITIS: ICD-10-CM

## 2024-04-15 RX ORDER — LEVOTHYROXINE SODIUM 112 UG/1
112 TABLET ORAL DAILY
Qty: 30 TABLET | Refills: 11 | Status: SHIPPED | OUTPATIENT
Start: 2024-04-15 | End: 2025-04-15

## 2024-04-20 DIAGNOSIS — E87.1 HYPO-OSMOLALITY AND HYPONATREMIA: ICD-10-CM

## 2024-04-22 ENCOUNTER — PREP FOR PROCEDURE (OUTPATIENT)
Dept: RADIOLOGY | Facility: HOSPITAL | Age: 84
End: 2024-04-22

## 2024-04-22 ENCOUNTER — HOSPITAL ENCOUNTER (OUTPATIENT)
Dept: RADIOLOGY | Facility: HOSPITAL | Age: 84
Discharge: HOME | End: 2024-04-22
Payer: MEDICARE

## 2024-04-22 VITALS
OXYGEN SATURATION: 98 % | BODY MASS INDEX: 27.99 KG/M2 | SYSTOLIC BLOOD PRESSURE: 161 MMHG | WEIGHT: 189 LBS | HEART RATE: 48 BPM | DIASTOLIC BLOOD PRESSURE: 74 MMHG | HEIGHT: 69 IN

## 2024-04-22 DIAGNOSIS — R39.16 BENIGN PROSTATIC HYPERPLASIA (BPH) WITH STRAINING ON URINATION: ICD-10-CM

## 2024-04-22 DIAGNOSIS — N40.1 BENIGN PROSTATIC HYPERPLASIA (BPH) WITH STRAINING ON URINATION: Primary | ICD-10-CM

## 2024-04-22 DIAGNOSIS — R39.16 BENIGN PROSTATIC HYPERPLASIA (BPH) WITH STRAINING ON URINATION: Primary | ICD-10-CM

## 2024-04-22 DIAGNOSIS — N40.1 BENIGN PROSTATIC HYPERPLASIA (BPH) WITH STRAINING ON URINATION: ICD-10-CM

## 2024-04-22 PROCEDURE — 99203 OFFICE O/P NEW LOW 30 MIN: CPT | Performed by: RADIOLOGY

## 2024-04-22 RX ORDER — FUROSEMIDE 20 MG/1
20 TABLET ORAL DAILY
Qty: 90 TABLET | Refills: 0 | Status: SHIPPED | OUTPATIENT
Start: 2024-04-22

## 2024-04-29 NOTE — CONSULTS
Interventional Radiology Consultation  4/22/24    Assessment/Plan:     Mr. Lucita Ren is a very pleasant 83-year-old man with CAD status post CABG, BPH, lower urinary tract symptoms including obstruction persistent despite medical therapy. He is referred for discussion regarding prostate artery embolization.      I discussed with the patient his feelings on interventional treatment in the setting of persistence of symptoms despite medical management. He has been dealing with bothersome urinary symptoms for many months and seems interested in considering intervention at this time. We discussed prostate artery embolization as a way to reduce prostate artery blood flow/perfusion, ultimately resulting in a decrease in prostate size and obstructive symptoms. We discussed the logistics of the outpatient procedure. We discussed post treatment expectations and side effects and specifically risks including vascular complications, non targeted embolization, and technical failure.      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 In light of this, I recommend a CT angiogram abdomen pelvis for better vascular characterization as a next step, to which he agrees. After completion of the CTA, I will call/discuss with him imaging findings and potential next steps. I do think if technically feasible he would serve to benefit from prostate artery embolization at this time.     Medical Problems       Problem List       Constipation    Cerebral infarction, unspecified (Multi)    CN VI palsy, right eye    Dysarthria following cerebrovascular accident    Dyspnea on exertion    Essential hypertension    GERD without esophagitis    Hyperlipidemia    Hyponatremia    Hypothyroidism    Intermittent claudication (CMS-HCC)    Lumbar pain    Memory loss    Reactive airway disease (HHS-HCC)     Renal arteriosclerosis    S/P CABG (coronary artery bypass graft)    Chronic fatigue    Enlarged prostate with urinary obstruction    Hyperplasia of prostate with lower urinary tract symptoms (LUTS)          Subjective:    History of Present Illness  Mr. Lucita Ren is an 83-year-old male with history of hypertension, hyperlipidemia, coronary artery disease status post CABG, intermittent claudication, prostate enlargement with urinary obstruction and lower urinary tract symptoms. He is referred by his primary provider Dr. Copeland, as well as his recent urology provider Dr. Castellanos for discussion regarding prostate artery embolization.     He reports a longstanding history of lower urinary tract symptoms related to his BPH. He reports daytime urinary frequency, nocturia approximately 5-6 times per night, and a weak urinary stream. He has tried medical therapy however was unable to tolerate Flomax secondary to dizziness. More recently, he did trial finasteride, and while he may not have taken the appropriately higher dose, he remains symptomatic.     He denies any current hematuria, dysuria, chest pain, shortness of breath. No recent fevers or chills.     CT CHEST WO IV CONTRAST 11/04/2023 [id number] Final       Lab on 03/28/2024   Component Date Value Ref Range Status    WBC 03/28/2024 5.4  4.4 - 11.3 x10*3/uL Final    nRBC 03/28/2024 0.0  0.0 - 0.0 /100 WBCs Final    RBC 03/28/2024 4.83  4.50 - 5.90 x10*6/uL Final    Hemoglobin 03/28/2024 14.7  13.5 - 17.5 g/dL Final    Hematocrit 03/28/2024 44.8  41.0 - 52.0 % Final    MCV 03/28/2024 93  80 - 100 fL Final    MCH 03/28/2024 30.4  26.0 - 34.0 pg Final    MCHC 03/28/2024 32.8  32.0 - 36.0 g/dL Final    RDW 03/28/2024 13.6  11.5 - 14.5 % Final    Platelets 03/28/2024 151  150 - 450 x10*3/uL Final    Neutrophils % 03/28/2024 66.6  40.0 - 80.0 % Final    Immature Granulocytes %, Automated 03/28/2024 0.4  0.0 - 0.9 % Final    Immature Granulocyte Count (IG) includes  promyelocytes, myelocytes and metamyelocytes but does not include bands. Percent differential counts (%) should be interpreted in the context of the absolute cell counts (cells/UL).    Lymphocytes % 03/28/2024 19.4  13.0 - 44.0 % Final    Monocytes % 03/28/2024 11.4  2.0 - 10.0 % Final    Eosinophils % 03/28/2024 1.8  0.0 - 6.0 % Final    Basophils % 03/28/2024 0.4  0.0 - 2.0 % Final    Neutrophils Absolute 03/28/2024 3.61  1.60 - 5.50 x10*3/uL Final    Percent differential counts (%) should be interpreted in the context of the absolute cell counts (cells/uL).    Immature Granulocytes Absolute, Au* 03/28/2024 0.02  0.00 - 0.50 x10*3/uL Final    Lymphocytes Absolute 03/28/2024 1.05  0.80 - 3.00 x10*3/uL Final    Monocytes Absolute 03/28/2024 0.62  0.05 - 0.80 x10*3/uL Final    Eosinophils Absolute 03/28/2024 0.10  0.00 - 0.40 x10*3/uL Final    Basophils Absolute 03/28/2024 0.02  0.00 - 0.10 x10*3/uL Final    Thyroid Stimulating Hormone 03/28/2024 2.01  0.44 - 3.98 mIU/L Final    Glucose 03/28/2024 98  74 - 99 mg/dL Final    Sodium 03/28/2024 131 (L)  136 - 145 mmol/L Final    Potassium 03/28/2024 4.2  3.5 - 5.3 mmol/L Final    Chloride 03/28/2024 99  98 - 107 mmol/L Final    Bicarbonate 03/28/2024 26  21 - 32 mmol/L Final    Anion Gap 03/28/2024 10  10 - 20 mmol/L Final    Urea Nitrogen 03/28/2024 17  6 - 23 mg/dL Final    Creatinine 03/28/2024 1.11  0.50 - 1.30 mg/dL Final    eGFR 03/28/2024 65  >60 mL/min/1.73m*2 Final    Calculations of estimated GFR are performed using the 2021 CKD-EPI Study Refit equation without the race variable for the IDMS-Traceable creatinine methods.  https://jasn.asnjournals.org/content/early/2021/09/22/ASN.2655142894    Calcium 03/28/2024 8.8  8.6 - 10.3 mg/dL Final    Albumin 03/28/2024 4.2  3.4 - 5.0 g/dL Final    Alkaline Phosphatase 03/28/2024 99  33 - 136 U/L Final    Total Protein 03/28/2024 7.0  6.4 - 8.2 g/dL Final    AST 03/28/2024 31  9 - 39 U/L Final    Bilirubin, Total  03/28/2024 0.7  0.0 - 1.2 mg/dL Final    ALT 03/28/2024 28  10 - 52 U/L Final    Patients treated with Sulfasalazine may generate falsely decreased results for ALT.   Lab on 12/21/2023   Component Date Value Ref Range Status    Glucose 12/21/2023 91  74 - 99 mg/dL Final    Sodium 12/21/2023 132 (L)  136 - 145 mmol/L Final    Potassium 12/21/2023 4.4  3.5 - 5.3 mmol/L Final    Chloride 12/21/2023 100  98 - 107 mmol/L Final    Bicarbonate 12/21/2023 26  21 - 32 mmol/L Final    Anion Gap 12/21/2023 10  10 - 20 mmol/L Final    Urea Nitrogen 12/21/2023 14  6 - 23 mg/dL Final    Creatinine 12/21/2023 1.03  0.50 - 1.30 mg/dL Final    eGFR 12/21/2023 72  >60 mL/min/1.73m*2 Final    Calculations of estimated GFR are performed using the 2021 CKD-EPI Study Refit equation without the race variable for the IDMS-Traceable creatinine methods.  https://jasn.asnjournals.org/content/early/2021/09/22/ASN.3798090188    Calcium 12/21/2023 8.7  8.6 - 10.3 mg/dL Final    Prostate Specific Antigen,Screen 12/21/2023 0.96  <=4.00 ng/mL Final    Thyroid Stimulating Hormone 12/21/2023 0.72  0.44 - 3.98 mIU/L Final   Lab on 11/13/2023   Component Date Value Ref Range Status    Glucose 11/13/2023 93  74 - 99 mg/dL Final    Sodium 11/13/2023 133 (L)  136 - 145 mmol/L Final    Potassium 11/13/2023 4.5  3.5 - 5.3 mmol/L Final    Chloride 11/13/2023 102  98 - 107 mmol/L Final    Bicarbonate 11/13/2023 26  21 - 32 mmol/L Final    Anion Gap 11/13/2023 10  10 - 20 mmol/L Final    Urea Nitrogen 11/13/2023 15  6 - 23 mg/dL Final    Creatinine 11/13/2023 1.04  0.50 - 1.30 mg/dL Final    eGFR 11/13/2023 71  >60 mL/min/1.73m*2 Final    Calculations of estimated GFR are performed using the 2021 CKD-EPI Study Refit equation without the race variable for the IDMS-Traceable creatinine methods.  https://jasn.asnjournals.org/content/early/2021/09/22/ASN.8691514561    Calcium 11/13/2023 9.0  8.6 - 10.3 mg/dL Final    Albumin 11/13/2023 4.1  3.4 - 5.0 g/dL Final     Alkaline Phosphatase 11/13/2023 98  33 - 136 U/L Final    Total Protein 11/13/2023 6.9  6.4 - 8.2 g/dL Final    AST 11/13/2023 31  9 - 39 U/L Final    Bilirubin, Total 11/13/2023 0.7  0.0 - 1.2 mg/dL Final    ALT 11/13/2023 28  10 - 52 U/L Final    Patients treated with Sulfasalazine may generate falsely decreased results for ALT.    WBC 11/13/2023 5.8  4.4 - 11.3 x10*3/uL Final    nRBC 11/13/2023 0.0  0.0 - 0.0 /100 WBCs Final    RBC 11/13/2023 4.62  4.50 - 5.90 x10*6/uL Final    Hemoglobin 11/13/2023 13.9  13.5 - 17.5 g/dL Final    Hematocrit 11/13/2023 42.9  41.0 - 52.0 % Final    MCV 11/13/2023 93  80 - 100 fL Final    MCH 11/13/2023 30.1  26.0 - 34.0 pg Final    MCHC 11/13/2023 32.4  32.0 - 36.0 g/dL Final    RDW 11/13/2023 13.8  11.5 - 14.5 % Final    Platelets 11/13/2023 122 (L)  150 - 450 x10*3/uL Final    Neutrophils % 11/13/2023 67.1  40.0 - 80.0 % Final    Immature Granulocytes %, Automated 11/13/2023 0.3  0.0 - 0.9 % Final    Immature Granulocyte Count (IG) includes promyelocytes, myelocytes and metamyelocytes but does not include bands. Percent differential counts (%) should be interpreted in the context of the absolute cell counts (cells/UL).    Lymphocytes % 11/13/2023 18.9  13.0 - 44.0 % Final    Monocytes % 11/13/2023 11.1  2.0 - 10.0 % Final    Eosinophils % 11/13/2023 1.9  0.0 - 6.0 % Final    Basophils % 11/13/2023 0.7  0.0 - 2.0 % Final    Neutrophils Absolute 11/13/2023 3.87  1.60 - 5.50 x10*3/uL Final    Percent differential counts (%) should be interpreted in the context of the absolute cell counts (cells/uL).    Immature Granulocytes Absolute, Au* 11/13/2023 0.02  0.00 - 0.50 x10*3/uL Final    Lymphocytes Absolute 11/13/2023 1.09  0.80 - 3.00 x10*3/uL Final    Monocytes Absolute 11/13/2023 0.64  0.05 - 0.80 x10*3/uL Final    Eosinophils Absolute 11/13/2023 0.11  0.00 - 0.40 x10*3/uL Final    Basophils Absolute 11/13/2023 0.04  0.00 - 0.10 x10*3/uL Final    Thyroid Stimulating Hormone  "11/13/2023 0.43 (L)  0.44 - 3.98 mIU/L Final    Thyroxine, Free 11/13/2023 1.20 (H)  0.61 - 1.12 ng/dL Final       Review of Systems  [A complete 10 point review of systems was performed and is negative except as stated above in the HPI.]       Objective:    Vitals:    04/22/24 1100   BP: 161/74   Pulse: (!) 48   SpO2: 98%   Weight: 85.7 kg (189 lb)   Height: 1.753 m (5' 9\")     GEN: Alert and oriented x 3, NAD.  HEENT:  No oropharyngeal swelling or erythema.   NECK: No cervical lymphadenopathy.   HEART: Regular rate and rhythm.   PULM: Thorax symmetric.   GI: Abdomen soft, nontender.   EXT:  No edema, cyanosis, clubbing.     No Known Allergies     Past Medical History:   Diagnosis Date    Other specified health status     No pertinent past surgical history    Personal history of other diseases of the circulatory system 06/20/2018    History of coronary artery disease        Past Surgical History:   Procedure Laterality Date    CORONARY ARTERY BYPASS GRAFT  06/20/2018    CABG    ELBOW SURGERY  06/20/2018    Elbow Surgery    MR HEAD ANGIO WO IV CONTRAST  6/12/2022    MR HEAD ANGIO WO IV CONTRAST 6[facility]12/2022 [facility]    MR NECK ANGIO WO IV CONTRAST  6/12/2022    MR NECK ANGIO WO IV CONTRAST 6/12/2022 [facility]        Social History     Tobacco Use   Smoking Status Never   Smokeless Tobacco Never         Social History     Substance and Sexual Activity   Alcohol Use Never         Social History     Substance and Sexual Activity   Drug Use Never         Current Outpatient Medications   Medication Sig Dispense Refill    amLODIPine (Norvasc) 10 mg tablet TAKE 1 TABLET BY MOUTH EVERY DAY 90 tablet 3    aspirin-calcium carbonate 81 mg-300 mg calcium(777 mg) tablet Take 1 tablet by mouth once daily.      atorvastatin (Lipitor) 40 mg tablet TAKE 1 TABLET BY MOUTH EVERYDAY AT BEDTIME 90 tablet 3    atorvastatin (Lipitor) 80 mg tablet Take 1 tablet (80 mg) by mouth once daily.      cholecalciferol, vitamin " D3, 250 mcg (10,000 unit) tablet Take 1 tablet (250 mcg) by mouth once daily.      clopidogrel (Plavix) 75 mg tablet TAKE 1 TABLET BY MOUTH EVERY DAY 90 tablet 3    cyanocobalamin (Vitamin B-12) 1,000 mcg tablet Take 1 tablet (1,000 mcg) by mouth once daily.      famotidine (Pepcid) 20 mg tablet Take 1 tablet (20 mg) by mouth once daily.      finasteride (Proscar) 5 mg tablet Take 1 tablet (5 mg) by mouth once daily. Do not crush, chew, or split. 30 tablet 11    furosemide (Lasix) 20 mg tablet TAKE 1 TABLET BY MOUTH EVERY DAY 90 tablet 0    lisinopril 10 mg tablet Take 1 tablet (10 mg) by mouth once daily.      sennosides (senna) 8.6 mg capsule Take by mouth.      sodium chloride 1,000 mg tablet Take 1 tablet (1 g) by mouth twice a day.      Synthroid 112 mcg tablet Take 1 tablet (112 mcg) by mouth once daily. 30 tablet 11     No current facility-administered medications for this encounter.       I have personally reviewed pertinent imaging and laboratory results.      This procedure has been fully reviewed with the patient and written informed consent has been obtained.      IR has been consulted to evaluate the patient, determine the appropriate procedure, and whether or not a procedure can and should be performed.     Thank you for allowing me to participate in the care of Lucita Ren.

## 2024-05-20 ENCOUNTER — OFFICE VISIT (OUTPATIENT)
Dept: PRIMARY CARE | Facility: CLINIC | Age: 84
End: 2024-05-20
Payer: MEDICARE

## 2024-05-20 ENCOUNTER — HOSPITAL ENCOUNTER (OUTPATIENT)
Dept: RADIOLOGY | Facility: HOSPITAL | Age: 84
Discharge: HOME | End: 2024-05-20
Payer: MEDICARE

## 2024-05-20 VITALS — DIASTOLIC BLOOD PRESSURE: 68 MMHG | SYSTOLIC BLOOD PRESSURE: 122 MMHG | OXYGEN SATURATION: 98 % | HEART RATE: 51 BPM

## 2024-05-20 DIAGNOSIS — M25.561 ACUTE PAIN OF RIGHT KNEE: ICD-10-CM

## 2024-05-20 DIAGNOSIS — M25.561 ACUTE PAIN OF RIGHT KNEE: Primary | ICD-10-CM

## 2024-05-20 DIAGNOSIS — S83.421A SPRAIN OF LATERAL COLLATERAL LIGAMENT OF RIGHT KNEE, INITIAL ENCOUNTER: Primary | ICD-10-CM

## 2024-05-20 PROCEDURE — 99213 OFFICE O/P EST LOW 20 MIN: CPT | Performed by: INTERNAL MEDICINE

## 2024-05-20 PROCEDURE — 3074F SYST BP LT 130 MM HG: CPT | Performed by: INTERNAL MEDICINE

## 2024-05-20 PROCEDURE — 73564 X-RAY EXAM KNEE 4 OR MORE: CPT | Mod: RT

## 2024-05-20 PROCEDURE — 73564 X-RAY EXAM KNEE 4 OR MORE: CPT | Mod: RIGHT SIDE | Performed by: RADIOLOGY

## 2024-05-20 PROCEDURE — 3078F DIAST BP <80 MM HG: CPT | Performed by: INTERNAL MEDICINE

## 2024-05-20 RX ORDER — MELOXICAM 15 MG/1
15 TABLET ORAL DAILY
Qty: 30 TABLET | Refills: 11 | Status: SHIPPED | OUTPATIENT
Start: 2024-05-20 | End: 2025-05-20

## 2024-05-20 RX ORDER — MELOXICAM 15 MG/1
15 TABLET ORAL DAILY
Qty: 30 TABLET | Refills: 11 | Status: SHIPPED | OUTPATIENT
Start: 2024-05-20 | End: 2024-05-20 | Stop reason: SDUPTHER

## 2024-05-20 NOTE — PROGRESS NOTES
Subjective   Patient ID: Lucita Ren is a 83 y.o. male who presents for No chief complaint on file..    Patient is here for complaints of pain in his right knee.  Patient slipped on a wet tile about 3 days ago.  His knee went out laterally he had an acute sharp pain in the right knee but no popping or snapping that he was aware of.  Since injury he has noticed some mild swelling on the lateral side of his right knee.-There is been no clicking or locking he is able to stand and walk without pain but when he sits down the pain seems to worsen.  He has noticed no joint instability.         Review of Systems    Objective   /68   Pulse 51   SpO2 98%     Physical Exam  Musculoskeletal:      Comments: There is swelling over the lateral tibial head.  There is tenderness with palpation at that point.  There is no redness warmth.  The remainder the joint exam is unremarkable there is no joint fluid or effusion there is no instability of any ligaments.  There is no joint line tenderness.         Assessment/Plan   Problem List Items Addressed This Visit             ICD-10-CM    Sprain of lateral collateral ligament of right knee - Primary S83.421A     Most likely lateral collateral ligament strain.  Patient will use alternate heat and ice he will take meloxicam 15 mg daily as needed and he will go to physical therapy to participate in active range of motion.  Also obtain plain films of the knee to rule out any bony abnormality but I am not particular concerned.         Relevant Medications    meloxicam (Mobic) 15 mg tablet          
8 (severe pain)

## 2024-05-20 NOTE — ASSESSMENT & PLAN NOTE
Most likely lateral collateral ligament strain.  Patient will use alternate heat and ice he will take meloxicam 15 mg daily as needed and he will go to physical therapy to participate in active range of motion.  Also obtain plain films of the knee to rule out any bony abnormality but I am not particular concerned.

## 2024-05-29 DIAGNOSIS — H04.129 DRY EYE SYNDROME OF UNSPECIFIED LACRIMAL GLAND: ICD-10-CM

## 2024-05-29 RX ORDER — VALSARTAN 80 MG/1
80 TABLET ORAL DAILY
Qty: 90 TABLET | Refills: 3 | Status: SHIPPED | OUTPATIENT
Start: 2024-05-29

## 2024-06-28 ENCOUNTER — LAB (OUTPATIENT)
Dept: LAB | Facility: LAB | Age: 84
End: 2024-06-28
Payer: MEDICARE

## 2024-06-28 ENCOUNTER — PREP FOR PROCEDURE (OUTPATIENT)
Dept: RADIOLOGY | Facility: HOSPITAL | Age: 84
End: 2024-06-28

## 2024-06-28 DIAGNOSIS — R39.16 BENIGN PROSTATIC HYPERPLASIA (BPH) WITH STRAINING ON URINATION: Primary | ICD-10-CM

## 2024-06-28 DIAGNOSIS — R39.16 BENIGN PROSTATIC HYPERPLASIA (BPH) WITH STRAINING ON URINATION: ICD-10-CM

## 2024-06-28 DIAGNOSIS — N40.1 BENIGN PROSTATIC HYPERPLASIA (BPH) WITH STRAINING ON URINATION: ICD-10-CM

## 2024-06-28 DIAGNOSIS — N40.1 BENIGN PROSTATIC HYPERPLASIA (BPH) WITH STRAINING ON URINATION: Primary | ICD-10-CM

## 2024-06-28 LAB
ANION GAP SERPL CALC-SCNC: 10 MMOL/L (ref 10–20)
BUN SERPL-MCNC: 15 MG/DL (ref 6–23)
CALCIUM SERPL-MCNC: 8.9 MG/DL (ref 8.6–10.3)
CHLORIDE SERPL-SCNC: 99 MMOL/L (ref 98–107)
CO2 SERPL-SCNC: 25 MMOL/L (ref 21–32)
CREAT SERPL-MCNC: 1.04 MG/DL (ref 0.5–1.3)
EGFRCR SERPLBLD CKD-EPI 2021: 71 ML/MIN/1.73M*2
GLUCOSE SERPL-MCNC: 91 MG/DL (ref 74–99)
POTASSIUM SERPL-SCNC: 4.6 MMOL/L (ref 3.5–5.3)
SODIUM SERPL-SCNC: 129 MMOL/L (ref 136–145)

## 2024-06-28 PROCEDURE — 80048 BASIC METABOLIC PNL TOTAL CA: CPT

## 2024-06-28 PROCEDURE — 36415 COLL VENOUS BLD VENIPUNCTURE: CPT

## 2024-07-01 ENCOUNTER — HOSPITAL ENCOUNTER (OUTPATIENT)
Dept: RADIOLOGY | Facility: HOSPITAL | Age: 84
Discharge: HOME | End: 2024-07-01
Payer: MEDICARE

## 2024-07-01 DIAGNOSIS — N40.1 BENIGN PROSTATIC HYPERPLASIA (BPH) WITH STRAINING ON URINATION: ICD-10-CM

## 2024-07-01 DIAGNOSIS — R39.16 BENIGN PROSTATIC HYPERPLASIA (BPH) WITH STRAINING ON URINATION: ICD-10-CM

## 2024-07-01 PROCEDURE — 2550000001 HC RX 255 CONTRASTS: Performed by: RADIOLOGY

## 2024-07-01 PROCEDURE — 74174 CTA ABD&PLVS W/CONTRAST: CPT

## 2024-07-01 PROCEDURE — 74174 CTA ABD&PLVS W/CONTRAST: CPT | Performed by: RADIOLOGY

## 2024-07-10 ENCOUNTER — PROCEDURE VISIT (OUTPATIENT)
Dept: UROLOGY | Facility: HOSPITAL | Age: 84
End: 2024-07-10
Payer: MEDICARE

## 2024-07-10 DIAGNOSIS — N40.1 BENIGN PROSTATIC HYPERPLASIA WITH INCOMPLETE BLADDER EMPTYING: Primary | ICD-10-CM

## 2024-07-10 DIAGNOSIS — Z79.2 PROPHYLACTIC ANTIBIOTIC: ICD-10-CM

## 2024-07-10 DIAGNOSIS — R39.14 BENIGN PROSTATIC HYPERPLASIA WITH INCOMPLETE BLADDER EMPTYING: Primary | ICD-10-CM

## 2024-07-10 PROCEDURE — 52000 CYSTOURETHROSCOPY: CPT | Performed by: STUDENT IN AN ORGANIZED HEALTH CARE EDUCATION/TRAINING PROGRAM

## 2024-07-10 PROCEDURE — 99213 OFFICE O/P EST LOW 20 MIN: CPT | Performed by: STUDENT IN AN ORGANIZED HEALTH CARE EDUCATION/TRAINING PROGRAM

## 2024-07-10 RX ORDER — CIPROFLOXACIN 500 MG/1
500 TABLET ORAL ONCE
Status: SHIPPED | OUTPATIENT
Start: 2024-07-10

## 2024-07-10 NOTE — PROGRESS NOTES
Subjective   Patient ID: Lucita Ren is a 83 y.o. male    HPI  83 y.o. male who presents for a cystoscopic evaluation due to symptoms of benign prostatic hyperplasia (BPH). During the procedure, the physician explained the steps and findings to the patient. The cystoscopy revealed no scar tissue or strictures, and the bladder appeared normal. The physician discussed treatment options, including the UroLift procedure and more invasive surgical options. The patient expressed interest in the UroLift procedure. The patient is currently on aspirin and another blood thinner due to a history of stroke. The physician advised consulting with the patient's neurologist and primary care physician to determine if it is safe to stop the blood thinner before the procedure.    The most recent PSA, conducted on 12/21/2023, revealed:  0.96 ng/ml       Review of Systems    All systems were reviewed. Anything negative was noted in the HPI.    Objective   Physical Exam    General: Well developed, well nourished, alert and cooperative, appears in no acute distress   Eyes: Non-injected conjunctiva, sclera clear, no proptosis   Cardiac: Extremities are warm and well perfused. No edema, cyanosis or pallor   Lungs: Breathing is easy, non-labored. Speaking in clear and complete sentences. Normal diaphragmatic movement   MSK: Ambulatory with steady gait, unassisted   Neuro: Alert and oriented to person, place, and time   Psych: Demonstrates good judgment and reason, without hallucinations, abnormal affect or abnormal behaviors   Skin: No obvious lesions, no rashes       No CVA tenderness bilaterally   No suprapubic pain or discomfort       Past Medical History:   Diagnosis Date    Other specified health status     No pertinent past surgical history    Personal history of other diseases of the circulatory system 06/20/2018    History of coronary artery disease         Past Surgical History:   Procedure Laterality Date    CORONARY ARTERY  BYPASS GRAFT  06/20/2018    CABG    ELBOW SURGERY  06/20/2018    Elbow Surgery    MR HEAD ANGIO WO IV CONTRAST  6/12/2022    MR HEAD ANGIO WO IV CONTRAST 6/12/2022 [facility]    MR NECK ANGIO WO IV CONTRAST  6/12/2022    MR NECK ANGIO WO IV CONTRAST 6/12/2022 [facility]       Procedure:  The patient was prepped using a Betadine solution. Lidocaine jelly was instilled into the urethra. The flexible cystoscope was sterilely inserted into the urethra and formal cystoscopy performed in a systematic fashion. For detailed findings of the procedure, please see Dr. Schultz’s remarks below  Scope A used, Cipro 500 mg p.o. given      Assessment/Plan   Cystoscopic evaluation of benign prostatic hyperplasia (BPH), Hx of stroke on Plavix    83 y.o. male who presents for the above condition, Today, we had a very long and extensive discussion with the patient regarding the pathophysiology, differential diagnosis, risk factor, associated condition, diagnostic work-up and management of BPH and lower urinary tract symptoms and acute urinary retention.  We discussed at length the mechanism of action, risk, benefit, adverse events and side effect of alpha-blocker in the form of tamsulosin 0.4 mg p.o nightly. We discussed in particular the risk of hypotension, lightheadedness, dizziness, and the risk of fall and bone fracture. Also discussed retrograde ejaculation of the side effects of the medication. We had another discussion with the patient regarding lifestyle modifications including low fluid intake after 5 PM, timed voiding every 2 hours, and decrease caffeine intake.     Plan:  - Schedule for UroLift  - PCP and Neuro clearances       7/10/2024    Scribe Attestation  By signing my name below, I, Scott House   attest that this documentation has been prepared under the direction and in the presence of Dr. Ezekiel Schultz

## 2024-07-10 NOTE — PROGRESS NOTES
Patient ID: Lucita Ren is a 83 y.o. male.    Cystoscopy    Date/Time: 7/10/2024 2:07 PM    Performed by: Ezekiel Schultz MD MPH  Authorized by: Ezekiel Schultz MD MPH    Procedure - Bladder Cystoscopy:     Procedure details: cystoscopy    PROCEDURE NOTE:    PREOPERATIVE DIAGNOSIS:  LUTS    POSTOPERATIVE DIAGNOSIS:  Same    OPERATION:  Flexible Cystourethroscopy      SURGEON:  Ezekiel Schultz MD MPH    ANESTHESIA:  2%  lidocaine jelly    COMPLICATIONS:  None    EBL: Minimal      DISPOSITION:  The patient was discharged home after the procedure, per routine.    INDICATIONS: :  Mr. Ren is a 83 y.o. patient with a history of LUTS who presents today for Cystoscopy.     The indications, risks and benefits of this procedure were discussed with the patient, consent was obtained prior to the procedure, and to the best of my judgement the patient seemed to understand and agree to the procedure.    PROCEDURE:  The patient  was brought into the procedure suite and informed consent was reviewed and confirmed. Vital signs were obtained prior to the procedure: There were no vitals taken for this visit..  The patient was escorted onto the stretcher, placed supine, prepped with betadine and draped in the usual standard surgical fashion.  Intraurethral 2% viscous lidocaine jelly was used for local analgesia.  A 16 [place] flexible cystourethroscope was inserted into the urethra.   The penile urethra was normal.  The prostate urethra was enlarged.  Upon entering the bladder the entire bladder was surveyed in a 360 degree fashion.  The left and right ureteral orifices were in normal orthotopic position effluxing clear yellow urine, bilaterally.   There was no evidence of any bladder lesions, foreign objects, stones or evidence of any mucosal changes. The cystoscope was then retroflexed.  The bladder neck was then further examined without any evidence of lesions. The scope was then removed and in an antegrade fashion, the  urethra and bladder were again resurveyed with no evidence of additional lesions.  The cystoscope was then fully removed.   The patient tolerated the procedure well.  Vitals were stable after the procedure.  The patient was able to void and was discharged home.  Verbal and written Post procedure instructions were reviewed with the patient.    IMPRESSION:  Moderate BPH  PS around 80gs    PLAN:  Fu for Urolift

## 2024-07-19 ENCOUNTER — APPOINTMENT (OUTPATIENT)
Dept: RADIOLOGY | Facility: HOSPITAL | Age: 84
End: 2024-07-19
Payer: MEDICARE

## 2024-07-19 DIAGNOSIS — I63.9 CEREBRAL INFARCTION, UNSPECIFIED (MULTI): ICD-10-CM

## 2024-07-19 RX ORDER — CLOPIDOGREL BISULFATE 75 MG/1
75 TABLET ORAL DAILY
Qty: 90 TABLET | Refills: 3 | Status: SHIPPED | OUTPATIENT
Start: 2024-07-19

## 2024-07-22 ENCOUNTER — HOSPITAL ENCOUNTER (OUTPATIENT)
Dept: RADIOLOGY | Facility: HOSPITAL | Age: 84
Discharge: HOME | End: 2024-07-22
Payer: MEDICARE

## 2024-07-22 DIAGNOSIS — R39.16 STRAINING TO VOID: ICD-10-CM

## 2024-07-22 DIAGNOSIS — N40.1 BENIGN PROSTATIC HYPERPLASIA WITH LOWER URINARY TRACT SYMPTOMS: ICD-10-CM

## 2024-07-22 PROCEDURE — 99212 OFFICE O/P EST SF 10 MIN: CPT | Performed by: RADIOLOGY

## 2024-07-29 NOTE — CONSULTS
Interventional Radiology Consultation  7/22/24    Assessment/Plan:  Mr. Lucita Ren is an 83-year-old man with significant cardiovascular history of CAD status post CABG, history of stroke on aspirin and Plavix, BPH with lower urinary tract symptoms including history of obstruction. He was referred to consider prostate artery embolization and presents for follow-up.      Since I saw the patient last on 04/22/2024, he did complete CT angiogram for further vascular assessment. Despite his prominent aortoiliac atherosclerotic disease, prostate artery embolization attempt does appear feasible. He has also been evaluated in the Urology Clinic and consider the uro lift procedure. Current, he feels that with optimized medical management his symptoms are more tolerable and he will continue to conservatively manage at this time. If his symptoms are now better controlled with medical management, I think this is very reasonable.      I will follow up with him again in 6 months. I encouraged him to reach out to us should he experience any decompensation or have any further questions about the prostate artery embolization procedure in the interim. I look forward to continuing to follow in his clinical care.     Medical Problems       Problem List       Constipation    Cerebral infarction, unspecified (Multi)    CN VI palsy, right eye    Dysarthria following cerebrovascular accident    Dyspnea on exertion    Essential hypertension    GERD without esophagitis    Hyperlipidemia    Hyponatremia    Hypothyroidism    Intermittent claudication (CMS-HCC)    Lumbar pain    Memory loss    Reactive airway disease (HHS-HCC)    Renal arteriosclerosis    S/P CABG (coronary artery bypass graft)    Chronic fatigue    Enlarged prostate with urinary obstruction    Hyperplasia of prostate with lower urinary tract symptoms (LUTS)    Sprain of lateral collateral ligament of right knee          Subjective:    History of Present Illness    Lucita Ren is an 83-year-old man with CAD status post CABG, BPH, lower urinary tract symptoms with obstruction who follows up for ongoing discussion regarding consideration prostate artery embolization.      I initially saw him on 04/22/2024. At that time he was considering interventional treatment for his obstructive symptoms and had been referred by Dr. Copeland for consideration of prostate artery embolization. His history of arterial atherosclerotic disease with known aortoiliac disease let us to order a CT angiogram for further characterization of his vascularity which was performed on 07/01/2024.      Since then he has also seen Dr. Lizzy Schultz in the Urology Clinic and considered a urolift. He states that with consistent increase in the dose of his finasteride his symptoms have been better controlled. He denies hematuria, dysuria, acute retention episodes, fevers, or chills.     CT angio abdomen pelvis w and or wo IV IV contrast 07/01/2024 [id number] Final     I personally reviewed CTA abd/pelvis 7/1/24 which demonstrates aortoiliac atherosclerosis with bilateral internal iliac artery patency.    Lab on 06/28/2024   Component Date Value Ref Range Status    Glucose 06/28/2024 91  74 - 99 mg/dL Final    Sodium 06/28/2024 129 (L)  136 - 145 mmol/L Final    Potassium 06/28/2024 4.6  3.5 - 5.3 mmol/L Final    Chloride 06/28/2024 99  98 - 107 mmol/L Final    Bicarbonate 06/28/2024 25  21 - 32 mmol/L Final    Anion Gap 06/28/2024 10  10 - 20 mmol/L Final    Urea Nitrogen 06/28/2024 15  6 - 23 mg/dL Final    Creatinine 06/28/2024 1.04  0.50 - 1.30 mg/dL Final    eGFR 06/28/2024 71  >60 mL/min/1.73m*2 Final    Calculations of estimated GFR are performed using the 2021 CKD-EPI Study Refit equation without the race variable for the IDMS-Traceable creatinine methods.  https://jasn.asnjournals.org/content/early/2021/09/22/ASN.7029121802    Calcium 06/28/2024 8.9  8.6 - 10.3 mg/dL Final   Lab on 03/28/2024   Component  Date Value Ref Range Status    WBC 03/28/2024 5.4  4.4 - 11.3 x10*3/uL Final    nRBC 03/28/2024 0.0  0.0 - 0.0 /100 WBCs Final    RBC 03/28/2024 4.83  4.50 - 5.90 x10*6/uL Final    Hemoglobin 03/28/2024 14.7  13.5 - 17.5 g/dL Final    Hematocrit 03/28/2024 44.8  41.0 - 52.0 % Final    MCV 03/28/2024 93  80 - 100 fL Final    MCH 03/28/2024 30.4  26.0 - 34.0 pg Final    MCHC 03/28/2024 32.8  32.0 - 36.0 g/dL Final    RDW 03/28/2024 13.6  11.5 - 14.5 % Final    Platelets 03/28/2024 151  150 - 450 x10*3/uL Final    Neutrophils % 03/28/2024 66.6  40.0 - 80.0 % Final    Immature Granulocytes %, Automated 03/28/2024 0.4  0.0 - 0.9 % Final    Immature Granulocyte Count (IG) includes promyelocytes, myelocytes and metamyelocytes but does not include bands. Percent differential counts (%) should be interpreted in the context of the absolute cell counts (cells/UL).    Lymphocytes % 03/28/2024 19.4  13.0 - 44.0 % Final    Monocytes % 03/28/2024 11.4  2.0 - 10.0 % Final    Eosinophils % 03/28/2024 1.8  0.0 - 6.0 % Final    Basophils % 03/28/2024 0.4  0.0 - 2.0 % Final    Neutrophils Absolute 03/28/2024 3.61  1.60 - 5.50 x10*3/uL Final    Percent differential counts (%) should be interpreted in the context of the absolute cell counts (cells/uL).    Immature Granulocytes Absolute, Au* 03/28/2024 0.02  0.00 - 0.50 x10*3/uL Final    Lymphocytes Absolute 03/28/2024 1.05  0.80 - 3.00 x10*3/uL Final    Monocytes Absolute 03/28/2024 0.62  0.05 - 0.80 x10*3/uL Final    Eosinophils Absolute 03/28/2024 0.10  0.00 - 0.40 x10*3/uL Final    Basophils Absolute 03/28/2024 0.02  0.00 - 0.10 x10*3/uL Final    Thyroid Stimulating Hormone 03/28/2024 2.01  0.44 - 3.98 mIU/L Final    Glucose 03/28/2024 98  74 - 99 mg/dL Final    Sodium 03/28/2024 131 (L)  136 - 145 mmol/L Final    Potassium 03/28/2024 4.2  3.5 - 5.3 mmol/L Final    Chloride 03/28/2024 99  98 - 107 mmol/L Final    Bicarbonate 03/28/2024 26  21 - 32 mmol/L Final    Anion Gap 03/28/2024  10  10 - 20 mmol/L Final    Urea Nitrogen 03/28/2024 17  6 - 23 mg/dL Final    Creatinine 03/28/2024 1.11  0.50 - 1.30 mg/dL Final    eGFR 03/28/2024 65  >60 mL/min/1.73m*2 Final    Calculations of estimated GFR are performed using the 2021 CKD-EPI Study Refit equation without the race variable for the IDMS-Traceable creatinine methods.  https://jasn.asnjournals.org/content/early/2021/09/22/ASN.3862867566    Calcium 03/28/2024 8.8  8.6 - 10.3 mg/dL Final    Albumin 03/28/2024 4.2  3.4 - 5.0 g/dL Final    Alkaline Phosphatase 03/28/2024 99  33 - 136 U/L Final    Total Protein 03/28/2024 7.0  6.4 - 8.2 g/dL Final    AST 03/28/2024 31  9 - 39 U/L Final    Bilirubin, Total 03/28/2024 0.7  0.0 - 1.2 mg/dL Final    ALT 03/28/2024 28  10 - 52 U/L Final    Patients treated with Sulfasalazine may generate falsely decreased results for ALT.       Review of Systems  Complete 10 point review of systems was performed and is negative except as stated above in the HPI.     Objective:    There were no vitals filed for this visit.     No Known Allergies     Past Medical History:   Diagnosis Date    Other specified health status     No pertinent past surgical history    Personal history of other diseases of the circulatory system 06/20/2018    History of coronary artery disease        Past Surgical History:   Procedure Laterality Date    CORONARY ARTERY BYPASS GRAFT  06/20/2018    CABG    ELBOW SURGERY  06/20/2018    Elbow Surgery    MR HEAD ANGIO WO IV CONTRAST  6/12/2022    MR HEAD ANGIO WO IV CONTRAST 6/12/2022 [facility]    MR NECK ANGIO WO IV CONTRAST  6/12/2022    MR NECK ANGIO WO IV CONTRAST 6/12/2022 [facility]        Social History     Tobacco Use   Smoking Status Never   Smokeless Tobacco Never         Social History     Substance and Sexual Activity   Alcohol Use Never         Social History     Substance and Sexual Activity   Drug Use Never         Current Outpatient Medications   Medication Sig  Dispense Refill    amLODIPine (Norvasc) 10 mg tablet TAKE 1 TABLET BY MOUTH EVERY DAY 90 tablet 3    aspirin-calcium carbonate 81 mg-300 mg calcium(777 mg) tablet Take 1 tablet by mouth once daily.      atorvastatin (Lipitor) 40 mg tablet TAKE 1 TABLET BY MOUTH EVERYDAY AT BEDTIME 90 tablet 3    atorvastatin (Lipitor) 80 mg tablet Take 1 tablet (80 mg) by mouth once daily.      cholecalciferol, vitamin D3, 250 mcg (10,000 unit) tablet Take 1 tablet (250 mcg) by mouth once daily.      clopidogrel (Plavix) 75 mg tablet TAKE 1 TABLET BY MOUTH EVERY DAY 90 tablet 3    cyanocobalamin (Vitamin B-12) 1,000 mcg tablet Take 1 tablet (1,000 mcg) by mouth once daily.      famotidine (Pepcid) 20 mg tablet Take 1 tablet (20 mg) by mouth once daily.      finasteride (Proscar) 5 mg tablet Take 1 tablet (5 mg) by mouth once daily. Do not crush, chew, or split. 30 tablet 11    furosemide (Lasix) 20 mg tablet TAKE 1 TABLET BY MOUTH EVERY DAY 90 tablet 0    lisinopril 10 mg tablet Take 1 tablet (10 mg) by mouth once daily.      meloxicam (Mobic) 15 mg tablet Take 1 tablet (15 mg) by mouth once daily. 30 tablet 11    sennosides (senna) 8.6 mg capsule Take by mouth.      sodium chloride 1,000 mg tablet Take 1 tablet (1 g) by mouth twice a day.      Synthroid 112 mcg tablet Take 1 tablet (112 mcg) by mouth once daily. 30 tablet 11    valsartan (Diovan) 80 mg tablet TAKE 1 TABLET BY MOUTH EVERY DAY 90 tablet 3     Current Facility-Administered Medications   Medication Dose Route Frequency Provider Last Rate Last Admin    ciprofloxacin (Cipro) tablet 500 mg  500 mg oral Once Ezekiel Schultz MD MPH           I have personally reviewed pertinent imaging and laboratory results.      This procedure has been fully reviewed with the patient and written informed consent has been obtained.      IR has been consulted to evaluate the patient, determine the appropriate procedure, and whether or not a procedure can and should be performed.     Thank  you for allowing me to participate in the care of Lucita Ren.

## 2024-08-10 ENCOUNTER — TELEPHONE (OUTPATIENT)
Dept: PRIMARY CARE | Facility: CLINIC | Age: 84
End: 2024-08-10
Payer: MEDICARE

## 2024-08-10 DIAGNOSIS — K60.2 ANAL FISSURE: Primary | ICD-10-CM

## 2024-08-10 RX ORDER — LIDOCAINE HYDROCHLORIDE AND HYDROCORTISONE ACETATE 30; 5 MG/G; MG/G
0.25 CREAM RECTAL 2 TIMES DAILY
Qty: 30 G | Refills: 3 | Status: SHIPPED | OUTPATIENT
Start: 2024-08-10

## 2024-08-12 NOTE — TELEPHONE ENCOUNTER
Patient called with symptoms of a perianal fissure which she has had in the past prescription for lidocaine gel and hydrocortisone cream were called in.

## 2024-08-28 DIAGNOSIS — H04.129 DRY EYE SYNDROME OF UNSPECIFIED LACRIMAL GLAND: ICD-10-CM

## 2024-08-28 DIAGNOSIS — E87.1 HYPONATREMIA: Primary | ICD-10-CM

## 2024-08-28 RX ORDER — VALSARTAN 80 MG/1
80 TABLET ORAL DAILY
Qty: 90 TABLET | Refills: 3 | Status: SHIPPED | OUTPATIENT
Start: 2024-08-28

## 2024-08-28 RX ORDER — SODIUM CHLORIDE 1000 MG
TABLET, SOLUBLE MISCELLANEOUS
Qty: 180 TABLET | Refills: 1 | Status: SHIPPED | OUTPATIENT
Start: 2024-08-28

## 2024-09-12 DIAGNOSIS — E87.1 HYPONATREMIA: Primary | ICD-10-CM

## 2024-09-16 ENCOUNTER — LAB (OUTPATIENT)
Dept: LAB | Facility: LAB | Age: 84
End: 2024-09-16
Payer: MEDICARE

## 2024-09-16 DIAGNOSIS — E87.1 HYPONATREMIA: ICD-10-CM

## 2024-09-16 LAB
ANION GAP SERPL CALC-SCNC: 8 MMOL/L (ref 10–20)
BUN SERPL-MCNC: 12 MG/DL (ref 6–23)
CALCIUM SERPL-MCNC: 8.5 MG/DL (ref 8.6–10.3)
CHLORIDE SERPL-SCNC: 103 MMOL/L (ref 98–107)
CO2 SERPL-SCNC: 27 MMOL/L (ref 21–32)
CREAT SERPL-MCNC: 0.99 MG/DL (ref 0.5–1.3)
EGFRCR SERPLBLD CKD-EPI 2021: 75 ML/MIN/1.73M*2
GLUCOSE SERPL-MCNC: 94 MG/DL (ref 74–99)
POTASSIUM SERPL-SCNC: 4.5 MMOL/L (ref 3.5–5.3)
SODIUM SERPL-SCNC: 133 MMOL/L (ref 136–145)

## 2024-09-16 PROCEDURE — 36415 COLL VENOUS BLD VENIPUNCTURE: CPT

## 2024-09-21 DIAGNOSIS — I10 ESSENTIAL (PRIMARY) HYPERTENSION: ICD-10-CM

## 2024-09-23 RX ORDER — ATORVASTATIN CALCIUM 40 MG/1
40 TABLET, FILM COATED ORAL NIGHTLY
Qty: 90 TABLET | Refills: 3 | Status: SHIPPED | OUTPATIENT
Start: 2024-09-23

## 2024-10-10 DIAGNOSIS — I10 ESSENTIAL (PRIMARY) HYPERTENSION: ICD-10-CM

## 2024-10-11 RX ORDER — AMLODIPINE BESYLATE 10 MG/1
10 TABLET ORAL DAILY
Qty: 90 TABLET | Refills: 3 | Status: SHIPPED | OUTPATIENT
Start: 2024-10-11

## 2024-11-03 DIAGNOSIS — H04.129 DRY EYE SYNDROME OF UNSPECIFIED LACRIMAL GLAND: ICD-10-CM

## 2024-11-03 DIAGNOSIS — E87.1 HYPONATREMIA: ICD-10-CM

## 2024-11-04 RX ORDER — SODIUM CHLORIDE 1000 MG
TABLET, SOLUBLE MISCELLANEOUS
Qty: 180 TABLET | Refills: 1 | Status: SHIPPED | OUTPATIENT
Start: 2024-11-04

## 2025-01-05 DIAGNOSIS — E87.1 HYPONATREMIA: ICD-10-CM

## 2025-01-05 DIAGNOSIS — H04.129 DRY EYE SYNDROME OF UNSPECIFIED LACRIMAL GLAND: ICD-10-CM

## 2025-01-06 RX ORDER — SODIUM CHLORIDE 1000 MG
TABLET, SOLUBLE MISCELLANEOUS
Qty: 180 TABLET | Refills: 1 | Status: SHIPPED | OUTPATIENT
Start: 2025-01-06

## 2025-01-30 DIAGNOSIS — E87.1 HYPONATREMIA: ICD-10-CM

## 2025-01-30 DIAGNOSIS — H04.129 DRY EYE SYNDROME OF UNSPECIFIED LACRIMAL GLAND: ICD-10-CM

## 2025-02-04 RX ORDER — SODIUM CHLORIDE 1000 MG
TABLET, SOLUBLE MISCELLANEOUS
Qty: 180 TABLET | Refills: 0 | Status: SHIPPED | OUTPATIENT
Start: 2025-02-04

## 2025-02-11 DIAGNOSIS — E06.3 HYPOTHYROIDISM DUE TO HASHIMOTO'S THYROIDITIS: ICD-10-CM

## 2025-02-11 RX ORDER — LEVOTHYROXINE SODIUM 112 UG/1
112 TABLET ORAL DAILY
Qty: 90 TABLET | Refills: 3 | Status: SHIPPED | OUTPATIENT
Start: 2025-02-11

## 2025-03-31 DIAGNOSIS — E87.1 HYPONATREMIA: ICD-10-CM

## 2025-03-31 DIAGNOSIS — H04.129 DRY EYE SYNDROME OF UNSPECIFIED LACRIMAL GLAND: ICD-10-CM

## 2025-03-31 RX ORDER — SODIUM CHLORIDE 1000 MG
TABLET, SOLUBLE MISCELLANEOUS
Qty: 180 TABLET | Refills: 0 | Status: SHIPPED | OUTPATIENT
Start: 2025-03-31

## 2025-04-01 DIAGNOSIS — Z00.00 ANNUAL PHYSICAL EXAM: ICD-10-CM

## 2025-04-02 ENCOUNTER — LAB (OUTPATIENT)
Dept: LAB | Facility: HOSPITAL | Age: 85
End: 2025-04-02
Payer: MEDICARE

## 2025-04-02 DIAGNOSIS — Z00.00 ENCOUNTER FOR GENERAL ADULT MEDICAL EXAMINATION WITHOUT ABNORMAL FINDINGS: Primary | ICD-10-CM

## 2025-04-02 LAB
25(OH)D3 SERPL-MCNC: 56 NG/ML (ref 30–100)
ALBUMIN SERPL BCP-MCNC: 4 G/DL (ref 3.4–5)
ALP SERPL-CCNC: 99 U/L (ref 33–136)
ALT SERPL W P-5'-P-CCNC: 19 U/L (ref 10–52)
ANION GAP SERPL CALC-SCNC: 10 MMOL/L (ref 10–20)
APPEARANCE UR: CLEAR
AST SERPL W P-5'-P-CCNC: 24 U/L (ref 9–39)
BASOPHILS # BLD AUTO: 0.02 X10*3/UL (ref 0–0.1)
BASOPHILS NFR BLD AUTO: 0.3 %
BILIRUB SERPL-MCNC: 0.7 MG/DL (ref 0–1.2)
BILIRUB UR STRIP.AUTO-MCNC: NEGATIVE MG/DL
BUN SERPL-MCNC: 9 MG/DL (ref 6–23)
CALCIUM SERPL-MCNC: 8.9 MG/DL (ref 8.6–10.3)
CHLORIDE SERPL-SCNC: 100 MMOL/L (ref 98–107)
CHOLEST SERPL-MCNC: 113 MG/DL (ref 0–199)
CHOLESTEROL/HDL RATIO: 2.2
CO2 SERPL-SCNC: 25 MMOL/L (ref 21–32)
COLOR UR: YELLOW
CREAT SERPL-MCNC: 0.99 MG/DL (ref 0.5–1.3)
EGFRCR SERPLBLD CKD-EPI 2021: 75 ML/MIN/1.73M*2
EOSINOPHIL # BLD AUTO: 0.08 X10*3/UL (ref 0–0.4)
EOSINOPHIL NFR BLD AUTO: 1.3 %
ERYTHROCYTE [DISTWIDTH] IN BLOOD BY AUTOMATED COUNT: 13.6 % (ref 11.5–14.5)
EST. AVERAGE GLUCOSE BLD GHB EST-MCNC: 111 MG/DL
GLUCOSE SERPL-MCNC: 94 MG/DL (ref 74–99)
GLUCOSE UR STRIP.AUTO-MCNC: NORMAL MG/DL
HBA1C MFR BLD: 5.5 %
HCT VFR BLD AUTO: 41.9 % (ref 41–52)
HDLC SERPL-MCNC: 51.1 MG/DL
HGB BLD-MCNC: 13.2 G/DL (ref 13.5–17.5)
HYALINE CASTS #/AREA URNS AUTO: ABNORMAL /LPF
IMM GRANULOCYTES # BLD AUTO: 0.03 X10*3/UL (ref 0–0.5)
IMM GRANULOCYTES NFR BLD AUTO: 0.5 % (ref 0–0.9)
KETONES UR STRIP.AUTO-MCNC: NEGATIVE MG/DL
LDLC SERPL CALC-MCNC: 50 MG/DL
LEUKOCYTE ESTERASE UR QL STRIP.AUTO: NEGATIVE
LYMPHOCYTES # BLD AUTO: 0.9 X10*3/UL (ref 0.8–3)
LYMPHOCYTES NFR BLD AUTO: 15.2 %
MCH RBC QN AUTO: 29.5 PG (ref 26–34)
MCHC RBC AUTO-ENTMCNC: 31.5 G/DL (ref 32–36)
MCV RBC AUTO: 94 FL (ref 80–100)
MONOCYTES # BLD AUTO: 0.52 X10*3/UL (ref 0.05–0.8)
MONOCYTES NFR BLD AUTO: 8.8 %
MUCOUS THREADS #/AREA URNS AUTO: ABNORMAL /LPF
NEUTROPHILS # BLD AUTO: 4.39 X10*3/UL (ref 1.6–5.5)
NEUTROPHILS NFR BLD AUTO: 73.9 %
NITRITE UR QL STRIP.AUTO: NEGATIVE
NON HDL CHOLESTEROL: 62 MG/DL (ref 0–149)
NRBC BLD-RTO: 0 /100 WBCS (ref 0–0)
PH UR STRIP.AUTO: 6.5 [PH]
PLATELET # BLD AUTO: 139 X10*3/UL (ref 150–450)
POTASSIUM SERPL-SCNC: 4.4 MMOL/L (ref 3.5–5.3)
PROT SERPL-MCNC: 6.6 G/DL (ref 6.4–8.2)
PROT UR STRIP.AUTO-MCNC: NORMAL MG/DL
RBC # BLD AUTO: 4.48 X10*6/UL (ref 4.5–5.9)
RBC # UR STRIP.AUTO: NEGATIVE MG/DL
RBC #/AREA URNS AUTO: ABNORMAL /HPF
SODIUM SERPL-SCNC: 131 MMOL/L (ref 136–145)
SP GR UR STRIP.AUTO: 1.02
T4 FREE SERPL-MCNC: 1.22 NG/DL (ref 0.61–1.12)
TRIGL SERPL-MCNC: 59 MG/DL (ref 0–149)
TSH SERPL-ACNC: 0.23 MIU/L (ref 0.44–3.98)
UROBILINOGEN UR STRIP.AUTO-MCNC: NORMAL MG/DL
VLDL: 12 MG/DL (ref 0–40)
WBC # BLD AUTO: 5.9 X10*3/UL (ref 4.4–11.3)
WBC #/AREA URNS AUTO: ABNORMAL /HPF

## 2025-04-02 PROCEDURE — 85025 COMPLETE CBC W/AUTO DIFF WBC: CPT

## 2025-04-02 PROCEDURE — 80061 LIPID PANEL: CPT

## 2025-04-02 PROCEDURE — 83036 HEMOGLOBIN GLYCOSYLATED A1C: CPT

## 2025-04-02 PROCEDURE — 84443 ASSAY THYROID STIM HORMONE: CPT

## 2025-04-02 PROCEDURE — 82306 VITAMIN D 25 HYDROXY: CPT

## 2025-04-02 PROCEDURE — 84439 ASSAY OF FREE THYROXINE: CPT

## 2025-04-02 PROCEDURE — 81001 URINALYSIS AUTO W/SCOPE: CPT

## 2025-04-02 PROCEDURE — 80053 COMPREHEN METABOLIC PANEL: CPT

## 2025-04-08 ENCOUNTER — APPOINTMENT (OUTPATIENT)
Dept: PRIMARY CARE | Facility: CLINIC | Age: 85
End: 2025-04-08
Payer: MEDICARE

## 2025-04-08 ENCOUNTER — HOSPITAL ENCOUNTER (OUTPATIENT)
Dept: CARDIOLOGY | Facility: HOSPITAL | Age: 85
Discharge: HOME | End: 2025-04-08
Payer: MEDICARE

## 2025-04-08 VITALS — DIASTOLIC BLOOD PRESSURE: 78 MMHG | HEART RATE: 52 BPM | OXYGEN SATURATION: 98 % | SYSTOLIC BLOOD PRESSURE: 122 MMHG

## 2025-04-08 DIAGNOSIS — R42 DIZZINESS: Primary | ICD-10-CM

## 2025-04-08 DIAGNOSIS — R42 DIZZINESS: ICD-10-CM

## 2025-04-08 DIAGNOSIS — I63.00 CEREBRAL INFARCTION DUE TO THROMBOSIS OF PRECEREBRAL ARTERY (MULTI): ICD-10-CM

## 2025-04-08 PROCEDURE — 3074F SYST BP LT 130 MM HG: CPT | Performed by: INTERNAL MEDICINE

## 2025-04-08 PROCEDURE — 3078F DIAST BP <80 MM HG: CPT | Performed by: INTERNAL MEDICINE

## 2025-04-08 PROCEDURE — 1036F TOBACCO NON-USER: CPT | Performed by: INTERNAL MEDICINE

## 2025-04-08 PROCEDURE — 93242 EXT ECG>48HR<7D RECORDING: CPT

## 2025-04-08 PROCEDURE — 99214 OFFICE O/P EST MOD 30 MIN: CPT | Performed by: INTERNAL MEDICINE

## 2025-04-08 ASSESSMENT — ENCOUNTER SYMPTOMS
DIZZINESS: 1
LOSS OF SENSATION IN FEET: 0
OCCASIONAL FEELINGS OF UNSTEADINESS: 0
DEPRESSION: 0

## 2025-04-08 NOTE — ASSESSMENT & PLAN NOTE
Will discontinue patient's aspirin and leave him on clopidogrel alone as he is now several years post stroke.

## 2025-04-08 NOTE — PROGRESS NOTES
Subjective   Patient ID: Lucita Ren is a 84 y.o. male who presents for Dizziness.    Patient is here with complaints of episodic dizziness.  Patient describes what sounds like possibly vasovagal or presyncopal symptoms although he is somewhat vague about it he describes feeling lightheaded and feel like he is going to faint however he denies any chest pain palpitations nausea vomiting or diaphoresis.  He also denies any true vertigo symptoms.  He has had the symptoms off and on for a number of years.  He has never worn a monitor.  He is otherwise feeling fairly well we reviewed his med list and cleaned that up he is on dual antiplatelet therapy and probably can get on single agent as his stroke was a number of years ago.    Dizziness         Review of Systems   Neurological:  Positive for dizziness.       Objective   /78   Pulse 52   SpO2 98%     Physical Exam  HENT:      Right Ear: Tympanic membrane and ear canal normal.      Left Ear: Tympanic membrane and ear canal normal.   Eyes:      Extraocular Movements: Extraocular movements intact.      Conjunctiva/sclera: Conjunctivae normal.      Pupils: Pupils are equal, round, and reactive to light.   Cardiovascular:      Rate and Rhythm: Normal rate and regular rhythm.      Heart sounds: Normal heart sounds.   Neurological:      General: No focal deficit present.         Assessment/Plan   Problem List Items Addressed This Visit             ICD-10-CM    Cerebral infarction, unspecified I63.9     Will discontinue patient's aspirin and leave him on clopidogrel alone as he is now several years post stroke.         Dizziness - Primary R42     Dizziness and lightheadedness of unclear etiology.  I suspect this is most likely benign in nature however we will check a Holter monitor to rule out any significant cardiac arrhythmias.  He has no prior history of A-fib or arrhythmia.  He does have resting bradycardia so he may be having intermittent heart block.  That  was not evident on exam today.         Relevant Orders    Holter or Event Cardiac Monitor

## 2025-04-08 NOTE — ASSESSMENT & PLAN NOTE
Dizziness and lightheadedness of unclear etiology.  I suspect this is most likely benign in nature however we will check a Holter monitor to rule out any significant cardiac arrhythmias.  He has no prior history of A-fib or arrhythmia.  He does have resting bradycardia so he may be having intermittent heart block.  That was not evident on exam today.

## 2025-04-28 ENCOUNTER — TELEPHONE (OUTPATIENT)
Dept: GASTROENTEROLOGY | Facility: CLINIC | Age: 85
End: 2025-04-28
Payer: MEDICARE

## 2025-04-28 NOTE — TELEPHONE ENCOUNTER
Attempted to speak with patient in regards to scheduling outpatient clinic visit. VM left for patient to return call to office at earliest convenience to schedule.

## 2025-05-01 ENCOUNTER — OFFICE VISIT (OUTPATIENT)
Dept: GASTROENTEROLOGY | Facility: CLINIC | Age: 85
End: 2025-05-01
Payer: MEDICARE

## 2025-05-01 VITALS
DIASTOLIC BLOOD PRESSURE: 74 MMHG | HEIGHT: 69 IN | BODY MASS INDEX: 27.13 KG/M2 | OXYGEN SATURATION: 99 % | HEART RATE: 50 BPM | SYSTOLIC BLOOD PRESSURE: 144 MMHG | RESPIRATION RATE: 18 BRPM | WEIGHT: 183.2 LBS

## 2025-05-01 DIAGNOSIS — K59.01 SLOW TRANSIT CONSTIPATION: Primary | ICD-10-CM

## 2025-05-01 DIAGNOSIS — E87.1 HYPONATREMIA: ICD-10-CM

## 2025-05-01 PROCEDURE — 3078F DIAST BP <80 MM HG: CPT | Performed by: INTERNAL MEDICINE

## 2025-05-01 PROCEDURE — 1159F MED LIST DOCD IN RCRD: CPT | Performed by: INTERNAL MEDICINE

## 2025-05-01 PROCEDURE — 99215 OFFICE O/P EST HI 40 MIN: CPT | Performed by: INTERNAL MEDICINE

## 2025-05-01 PROCEDURE — 3077F SYST BP >= 140 MM HG: CPT | Performed by: INTERNAL MEDICINE

## 2025-05-01 RX ORDER — BISACODYL 5 MG
10 TABLET, DELAYED RELEASE (ENTERIC COATED) ORAL DAILY PRN
Qty: 60 TABLET | Refills: 2 | Status: SHIPPED | OUTPATIENT
Start: 2025-05-01 | End: 2025-07-30

## 2025-05-01 NOTE — PROGRESS NOTES
"Subjective   Patient ID: Lucita Ren is a 84 y.o. male who presents for Establish Care (Pt states he has battled constipation since childhood-he takes miralax and 1 senna in the am and 2 senna at bedtime. Reports being prescribed Linzess at one time with no results. Pt states he feels bloated at times that makes him also feel abd discomfort. Other symptoms include excessive gas. Pt reports his diet has not changed. States he has noted the GI changes after his stroke 4 years ago. /Last egd/colon 02/2023).  HPI      Stroke 4-5 years ago and since then he is having issues with Constipation.   He had EGD and colonoscopy in 2023 and it was normal.     Started on Linzess and it did not help.   he takes miralax and 1 senna in the am and 2 senna. 2 years.     Warm water enema. He had to do both to have a BM.     + bloating.                   Medications Ordered Prior to Encounter[1]     Review of Systems    Objective   Physical Exam  /74 (BP Location: Left arm, Patient Position: Sitting, BP Cuff Size: Adult)   Pulse 50   Resp 18   Ht 1.753 m (5' 9\")   Wt 83.1 kg (183 lb 3.2 oz)   SpO2 99%   BMI 27.05 kg/m²      Lab Results   Component Value Date    WBC 5.9 04/02/2025    HGB 13.2 (L) 04/02/2025    HCT 41.9 04/02/2025    MCV 94 04/02/2025     (L) 04/02/2025           No lab exists for component: \"LABALBU\"    No results found for: \"AFP\"  Lab Results   Component Value Date    TSH 0.23 (L) 04/02/2025         Assessment/Plan   Diagnoses and all orders for this visit:  Slow transit constipation  -     plecanatide (Trulance) tablet tablet; Take 1 tablet (3 mg) by mouth once daily.  -     bisacodyl (Dulcolax) 5 mg EC tablet; Take 2 tablets (10 mg) by mouth once daily as needed for constipation. Do not crush, chew, or split.    Stop Senna  Start Trulance 1 tab 30 min before breakfast daily. Do it for 1-2 days and see if you do not need any other medications.   If No BM with Trulance then you can take Dulcolax " 10mg oral once a day as needed.   If still no BM then can take Warm water enema.   Virtual visit in 4 weeks.   Start 1 kiwi fruit daily.    We will refer you to Nephrology.   Call with questions.                   [1]   Current Outpatient Medications on File Prior to Visit   Medication Sig Dispense Refill    amLODIPine (Norvasc) 10 mg tablet TAKE 1 TABLET BY MOUTH EVERY DAY 90 tablet 3    aspirin-calcium carbonate 81 mg-300 mg calcium(777 mg) tablet Take 1 tablet by mouth once daily.      atorvastatin (Lipitor) 40 mg tablet TAKE 1 TABLET BY MOUTH EVERYDAY AT BEDTIME 90 tablet 3    cholecalciferol, vitamin D3, 250 mcg (10,000 unit) tablet Take 1 tablet (250 mcg) by mouth once daily.      clopidogrel (Plavix) 75 mg tablet TAKE 1 TABLET BY MOUTH EVERY DAY 90 tablet 3    cyanocobalamin (Vitamin B-12) 1,000 mcg tablet Take 1 tablet (1,000 mcg) by mouth once daily.      famotidine (Pepcid) 20 mg tablet Take 1 tablet (20 mg) by mouth once daily.      furosemide (Lasix) 20 mg tablet TAKE 1 TABLET BY MOUTH EVERY DAY 90 tablet 0    lidocaine HCl-hydrocortison ac 3-0.5 % cream Insert 0.25 g into the rectum 2 times a day. 30 g 3    sennosides (senna) 8.6 mg capsule Take by mouth.      sodium chloride 1,000 mg tablet TAKE 3 TABLETS BY MOUTH 2 TIMES A DAY. 180 tablet 0    Synthroid 112 mcg tablet TAKE 1 TABLET BY MOUTH ONCE DAILY. 90 tablet 3    valsartan (Diovan) 80 mg tablet Take 1 tablet (80 mg) by mouth once daily. 90 tablet 3    meloxicam (Mobic) 15 mg tablet Take 1 tablet (15 mg) by mouth once daily. (Patient not taking: Reported on 5/1/2025) 30 tablet 11     Current Facility-Administered Medications on File Prior to Visit   Medication Dose Route Frequency Provider Last Rate Last Admin    ciprofloxacin (Cipro) tablet 500 mg  500 mg oral Once Ezekiel Schultz MD MPH          Dispense Refill    amLODIPine (Norvasc) 10 mg tablet TAKE 1 TABLET BY MOUTH EVERY DAY 90 tablet 3    aspirin-calcium carbonate 81 mg-300 mg calcium(777 mg) tablet Take 1 tablet by mouth once daily.      atorvastatin (Lipitor) 40 mg tablet TAKE 1 TABLET BY MOUTH EVERYDAY AT BEDTIME 90 tablet 3    cholecalciferol, vitamin D3, 250 mcg (10,000 unit) tablet Take 1 tablet (250 mcg) by mouth once daily.      clopidogrel (Plavix) 75 mg tablet TAKE 1 TABLET BY MOUTH EVERY DAY 90 tablet 3    cyanocobalamin (Vitamin B-12) 1,000 mcg tablet Take 1 tablet (1,000 mcg) by mouth once daily.      famotidine (Pepcid) 20 mg tablet Take 1 tablet (20 mg) by mouth once daily.      furosemide (Lasix) 20 mg tablet TAKE 1 TABLET BY MOUTH EVERY DAY 90 tablet 0    lidocaine HCl-hydrocortison ac 3-0.5 % cream Insert 0.25 g into the rectum 2 times a day. 30 g 3    sennosides (senna) 8.6 mg capsule Take by mouth.      sodium chloride 1,000 mg tablet TAKE 3 TABLETS BY MOUTH 2 TIMES A DAY. 180 tablet 0    Synthroid 112 mcg tablet TAKE 1 TABLET BY MOUTH ONCE DAILY. 90 tablet 3    valsartan (Diovan) 80 mg tablet Take 1 tablet (80 mg) by mouth once daily. 90 tablet 3    meloxicam (Mobic) 15 mg tablet Take 1 tablet (15 mg) by mouth once daily. (Patient not taking: Reported on 5/1/2025) 30 tablet 11     Current Facility-Administered Medications on File Prior to Visit   Medication Dose Route Frequency Provider Last Rate Last Admin    ciprofloxacin (Cipro) tablet 500 mg  500 mg oral Once Ezekiel Schultz MD MPH

## 2025-05-09 ASSESSMENT — ENCOUNTER SYMPTOMS
ENDOCRINE NEGATIVE: 1
CARDIOVASCULAR NEGATIVE: 1
CONSTIPATION: 1
RESPIRATORY NEGATIVE: 1
NEUROLOGICAL NEGATIVE: 1
CONSTITUTIONAL NEGATIVE: 1

## 2025-05-13 DIAGNOSIS — H04.129 DRY EYE SYNDROME OF UNSPECIFIED LACRIMAL GLAND: ICD-10-CM

## 2025-05-13 DIAGNOSIS — E87.1 HYPONATREMIA: ICD-10-CM

## 2025-05-14 RX ORDER — SODIUM CHLORIDE 1000 MG
TABLET, SOLUBLE MISCELLANEOUS
Qty: 180 TABLET | Refills: 0 | Status: SHIPPED | OUTPATIENT
Start: 2025-05-14

## 2025-05-19 ENCOUNTER — TELEPHONE (OUTPATIENT)
Dept: PRIMARY CARE | Facility: CLINIC | Age: 85
End: 2025-05-19
Payer: MEDICARE

## 2025-05-19 DIAGNOSIS — K59.00 CONSTIPATION, UNSPECIFIED CONSTIPATION TYPE: Primary | ICD-10-CM

## 2025-05-20 DIAGNOSIS — K59.01 SLOW TRANSIT CONSTIPATION: Primary | ICD-10-CM

## 2025-05-22 ENCOUNTER — HOSPITAL ENCOUNTER (OUTPATIENT)
Dept: RADIOLOGY | Facility: HOSPITAL | Age: 85
Discharge: HOME | End: 2025-05-22
Payer: MEDICARE

## 2025-05-22 DIAGNOSIS — K59.01 SLOW TRANSIT CONSTIPATION: ICD-10-CM

## 2025-05-22 PROCEDURE — 74018 RADEX ABDOMEN 1 VIEW: CPT

## 2025-05-22 PROCEDURE — 74018 RADEX ABDOMEN 1 VIEW: CPT | Performed by: STUDENT IN AN ORGANIZED HEALTH CARE EDUCATION/TRAINING PROGRAM

## 2025-05-27 ENCOUNTER — TELEPHONE (OUTPATIENT)
Dept: PRIMARY CARE | Facility: CLINIC | Age: 85
End: 2025-05-27
Payer: MEDICARE

## 2025-05-28 ENCOUNTER — OFFICE VISIT (OUTPATIENT)
Dept: GASTROENTEROLOGY | Facility: CLINIC | Age: 85
End: 2025-05-28
Payer: MEDICARE

## 2025-05-28 VITALS — WEIGHT: 176 LBS | HEIGHT: 69 IN | BODY MASS INDEX: 26.07 KG/M2

## 2025-05-28 DIAGNOSIS — K59.04 CHRONIC IDIOPATHIC CONSTIPATION: Primary | ICD-10-CM

## 2025-05-28 PROCEDURE — 1159F MED LIST DOCD IN RCRD: CPT | Performed by: INTERNAL MEDICINE

## 2025-05-28 PROCEDURE — 99214 OFFICE O/P EST MOD 30 MIN: CPT | Performed by: INTERNAL MEDICINE

## 2025-05-28 RX ORDER — LUBIPROSTONE 24 UG/1
24 CAPSULE ORAL
Qty: 60 CAPSULE | Refills: 11 | Status: SHIPPED | OUTPATIENT
Start: 2025-05-28 | End: 2026-05-28

## 2025-05-28 ASSESSMENT — ENCOUNTER SYMPTOMS
MUSCULOSKELETAL NEGATIVE: 1
CONSTIPATION: 1
PSYCHIATRIC NEGATIVE: 1
ENDOCRINE NEGATIVE: 1
CARDIOVASCULAR NEGATIVE: 1
RESPIRATORY NEGATIVE: 1
NEUROLOGICAL NEGATIVE: 1
CONSTITUTIONAL NEGATIVE: 1
HEMATOLOGIC/LYMPHATIC NEGATIVE: 1
EYES NEGATIVE: 1

## 2025-05-28 NOTE — ASSESSMENT & PLAN NOTE
Patient is an 84-year-old with hyperlipidemia, hypertension, chronic constipation, BPH and previous stroke who has suffered with constipation for years.  He had a colonoscopy 2023 showing no anatomic abnormality.  He also has hypothyroidism and his TSH is in reasonable control.  He has tried multiple remedies in the past including 3 senna per day, MiraLAX daily, Linzess, Metamucil, he exercises, and drinks plenty water.  At this point I have suggested that he start Metamucil daily, continue MiraLAX twice a day, and I am going to prescribe Amitiza 24 mcg twice a day.  He will wean off his senna at this point.  He is otherwise doing well.  He will call me in 1 month with a follow-up.

## 2025-05-28 NOTE — PROGRESS NOTES
Chronic constipation    History of Present Illness:   Lucita Ren is a 84 y.o. male with PMHx of Chronic constipation, hypertension, hyperlipidemia, previous stroke, colonoscopy in 2023 and who presents to GI clinic for consultation.  Last seen around 2023 for colonoscopy.  He denies rectal bleeding, fever, diarrhea.  He has a history of hypothyroidism.  He said chronic constipation tried multiple remedies in the past.  Has tried Linzess, Metamucil, MiraLAX, senna, enemas, and magnesium citrate.  He has a bowel movement every day or every other day.  If he does not have a bowel movement he feels uncomfortable.  He has no other major complaints.  He denies any bleeding.  He denies any severe abdominal pain.    His medications are reviewed    He does not smoke or drink alcohol heavily    His last colonoscopy and lab data is reviewed    Review of Systems  Review of Systems   Constitutional: Negative.    HENT: Negative.     Eyes: Negative.    Respiratory: Negative.     Cardiovascular: Negative.    Gastrointestinal:  Positive for constipation.   Endocrine: Negative.    Genitourinary: Negative.    Musculoskeletal: Negative.    Skin: Negative.    Neurological: Negative.    Hematological: Negative.    Psychiatric/Behavioral: Negative.         Allergies  RX Allergies[1]    Medications  Current Outpatient Medications   Medication Instructions    amLODIPine (NORVASC) 10 mg, oral, Daily    aspirin-calcium carbonate 81 mg-300 mg calcium(777 mg) tablet 1 tablet, Daily    atorvastatin (LIPITOR) 40 mg, oral, Nightly    bisacodyl (DULCOLAX) 10 mg, oral, Daily PRN, Do not crush, chew, or split.    cholecalciferol, vitamin D3, 250 mcg (10,000 unit) tablet 1 tablet, Daily    clopidogrel (PLAVIX) 75 mg, oral, Daily    cyanocobalamin (VITAMIN B-12) 1,000 mcg, Daily RT    famotidine (PEPCID) 20 mg, Daily    furosemide (LASIX) 20 mg, oral, Daily    lidocaine HCl-hydrocortison ac 3-0.5 % cream 0.25 g, rectal, 2 times daily     plecanatide (TRULANCE) 3 mg, oral, Daily    sennosides (senna) 8.6 mg capsule Take by mouth.    sodium chloride 1,000 mg tablet TAKE 3 TABLETS BY MOUTH 2 TIMES A DAY.    Synthroid 112 mcg, oral, Daily    valsartan (DIOVAN) 80 mg, oral, Daily        Objective   There were no vitals taken for this visit.   Physical Exam  Constitutional:       Appearance: Normal appearance.   Eyes:      Conjunctiva/sclera: Conjunctivae normal.   Cardiovascular:      Rate and Rhythm: Normal rate and regular rhythm.   Pulmonary:      Effort: Pulmonary effort is normal.      Breath sounds: Normal breath sounds.   Abdominal:      General: Abdomen is flat. Bowel sounds are normal.      Palpations: Abdomen is soft.   Musculoskeletal:         General: Normal range of motion.      Cervical back: Normal range of motion.   Neurological:      Mental Status: He is alert.           Lab Results   Component Value Date    WBC 5.9 04/02/2025    WBC 5.4 03/28/2024    WBC 5.8 11/13/2023    HGB 13.2 (L) 04/02/2025    HGB 14.7 03/28/2024    HGB 13.9 11/13/2023    HCT 41.9 04/02/2025    HCT 44.8 03/28/2024    HCT 42.9 11/13/2023     (L) 04/02/2025     03/28/2024     (L) 11/13/2023     Lab Results   Component Value Date     (L) 04/02/2025     (L) 09/16/2024     (L) 06/28/2024    K 4.4 04/02/2025    K 4.5 09/16/2024    K 4.6 06/28/2024     04/02/2025     09/16/2024    CL 99 06/28/2024    CO2 25 04/02/2025    CO2 27 09/16/2024    CO2 25 06/28/2024    BUN 9 04/02/2025    BUN 12 09/16/2024    BUN 15 06/28/2024    CREATININE 0.99 04/02/2025    CREATININE 0.99 09/16/2024    CREATININE 1.04 06/28/2024    CALCIUM 8.9 04/02/2025    CALCIUM 8.5 (L) 09/16/2024    CALCIUM 8.9 06/28/2024    PROT 6.6 04/02/2025    PROT 7.0 03/28/2024    PROT 6.9 11/13/2023    BILITOT 0.7 04/02/2025    BILITOT 0.7 03/28/2024    BILITOT 0.7 11/13/2023    ALKPHOS 99 04/02/2025    ALKPHOS 99 03/28/2024    ALKPHOS 98 11/13/2023    ALT 19  04/02/2025    ALT 28 03/28/2024    ALT 28 11/13/2023    AST 24 04/02/2025    AST 31 03/28/2024    AST 31 11/13/2023    GLUCOSE 94 04/02/2025    GLUCOSE 94 09/16/2024    GLUCOSE 91 06/28/2024           Lucita Ren is a 84 y.o. male who presents to GI clinic for chronic idiopathic constipation.    Constipation  Patient is an 84-year-old with hyperlipidemia, hypertension, chronic constipation, BPH and previous stroke who has suffered with constipation for years.  He had a colonoscopy 2023 showing no anatomic abnormality.  He also has hypothyroidism and his TSH is in reasonable control.  He has tried multiple remedies in the past including 3 senna per day, MiraLAX daily, Linzess, Metamucil, he exercises, and drinks plenty water.  At this point I have suggested that he start Metamucil daily, continue MiraLAX twice a day, and I am going to prescribe Amitiza 24 mcg twice a day.  He will wean off his senna at this point.  He is otherwise doing well.  He will call me in 1 month with a follow-up.       Hayder Crowley MD              [1] No Known Allergies

## 2025-06-02 ENCOUNTER — APPOINTMENT (OUTPATIENT)
Dept: GASTROENTEROLOGY | Facility: CLINIC | Age: 85
End: 2025-06-02
Payer: MEDICARE

## 2025-06-02 DIAGNOSIS — K59.00 CONSTIPATION, UNSPECIFIED CONSTIPATION TYPE: Primary | ICD-10-CM

## 2025-06-02 PROCEDURE — 99214 OFFICE O/P EST MOD 30 MIN: CPT | Performed by: INTERNAL MEDICINE

## 2025-06-02 ASSESSMENT — ENCOUNTER SYMPTOMS
CARDIOVASCULAR NEGATIVE: 1
RESPIRATORY NEGATIVE: 1
CONSTITUTIONAL NEGATIVE: 1
ENDOCRINE NEGATIVE: 1
NEUROLOGICAL NEGATIVE: 1

## 2025-06-02 NOTE — PROGRESS NOTES
"Subjective   Patient ID: Lucita Ren is a 84 y.o. male who presents for No chief complaint on file..  HPI    Miralax 2 capful BID  Senna 1 tab in AM and 2 tab in PM  Trulance 1 tab a day.     Reports difficulty with Amitiza as he only eat 1 meal a day.  Reports having a bowel movement today which was after few days.  It was diluted with he feels completely evacuated.  It does appear that overall oral intake is limited.  In the meantime he also had a [place]    Medications Ordered Prior to Encounter[1]     Review of Systems   Constitutional: Negative.    Respiratory: Negative.     Cardiovascular: Negative.    Endocrine: Negative.    Genitourinary: Negative.    Skin: Negative.    Neurological: Negative.        Objective   Physical Exam  There were no vitals taken for this visit.   This is a virtual visit  Lab Results   Component Value Date    WBC 5.9 04/02/2025    HGB 13.2 (L) 04/02/2025    HCT 41.9 04/02/2025    MCV 94 04/02/2025     (L) 04/02/2025           No lab exists for component: \"LABALBU\"    No results found for: \"AFP\"  Lab Results   Component Value Date    TSH 0.23 (L) 04/02/2025     XR abdomen 1 view  Status: Final result     PACS Images     Show images for XR abdomen 1 view  Signed by    Signed Time Phone Pager   Asher Davis MD 5/23/2025 08:25 [phone]      Exam Information    Status Exam Begun Exam Ended   Final 5/22/2025 13:13 5/22/2025 13:23     Study Result    Narrative & Impression   Interpreted By:  Asher Davis,   STUDY:  XR ABDOMEN 1 VIEW;  5/22/2025 1:23 pm      INDICATION:  Signs/Symptoms:constipation.      COMPARISON:  CTA abdomen and pelvis 07/01/2024      ACCESSION NUMBER(S):  [id number]      ORDERING CLINICIAN:  RAMIREZ GOMEZ      FINDINGS:  Nonobstructive bowel gas pattern. Limited evaluation of  pneumoperitoneum on supine imaging, however no gross evidence of free  air is noted. No significant stool burden.    "   Visualized lungs are clear.      Osseous structures demonstrate no acute bony changes.      IMPRESSION:  1.  Nonobstructive bowel gas pattern.      Signed by: Asher Davis 5/23/2025 8:25 AM  Dictation workstation:   [id number]       Assessment/Plan   Diagnoses and all orders for this visit:  Constipation, unspecified constipation type  -     XR abdomen 1 view; Future  Please complete Anorectal manometry.   Follow up discussion in 7-10 days.,   For now continue Trulance 1 tablet oral daily 30 minutes before breakfast.  MiraLAX 1 capful in a glass of water daily or twice a day.  Continue senna 1 tablet in the morning and 2 tablets in the evening.  Keep a diary of your bowel frequency.                [1]   Current Outpatient Medications on File Prior to Visit   Medication Sig Dispense Refill    amLODIPine (Norvasc) 10 mg tablet TAKE 1 TABLET BY MOUTH EVERY DAY 90 tablet 3    aspirin-calcium carbonate 81 mg-300 mg calcium(777 mg) tablet Take 1 tablet by mouth once daily.      atorvastatin (Lipitor) 40 mg tablet TAKE 1 TABLET BY MOUTH EVERYDAY AT BEDTIME 90 tablet 3    bisacodyl (Dulcolax) 5 mg EC tablet Take 2 tablets (10 mg) by mouth once daily as needed for constipation. Do not crush, chew, or split. 60 tablet 2    cholecalciferol, vitamin D3, 250 mcg (10,000 unit) tablet Take 1 tablet (250 mcg) by mouth once daily.      clopidogrel (Plavix) 75 mg tablet TAKE 1 TABLET BY MOUTH EVERY DAY 90 tablet 3    cyanocobalamin (Vitamin B-12) 1,000 mcg tablet Take 1 tablet (1,000 mcg) by mouth once daily.      famotidine (Pepcid) 20 mg tablet Take 1 tablet (20 mg) by mouth once daily.      furosemide (Lasix) 20 mg tablet TAKE 1 TABLET BY MOUTH EVERY DAY 90 tablet 0    lidocaine HCl-hydrocortison ac 3-0.5 % cream Insert 0.25 g into the rectum 2 times a day. 30 g 3    lubiprostone (Amitiza) 24 mcg capsule Take 1 capsule (24 mcg) by mouth 2 times daily (morning and late afternoon). Take with meals 60 capsule 11     plecanatide (Trulance) tablet tablet Take 1 tablet (3 mg) by mouth once daily. 30 tablet 2    sennosides (senna) 8.6 mg capsule Take by mouth.      sodium chloride 1,000 mg tablet TAKE 3 TABLETS BY MOUTH 2 TIMES A DAY. 180 tablet 0    Synthroid 112 mcg tablet TAKE 1 TABLET BY MOUTH ONCE DAILY. 90 tablet 3    valsartan (Diovan) 80 mg tablet Take 1 tablet (80 mg) by mouth once daily. 90 tablet 3     Current Facility-Administered Medications on File Prior to Visit   Medication Dose Route Frequency Provider Last Rate Last Admin    ciprofloxacin (Cipro) tablet 500 mg  500 mg oral Once Ezekiel Schultz MD [facility]

## 2025-06-09 ENCOUNTER — HOSPITAL ENCOUNTER (OUTPATIENT)
Dept: RADIOLOGY | Facility: HOSPITAL | Age: 85
Discharge: HOME | End: 2025-06-09
Payer: MEDICARE

## 2025-06-09 DIAGNOSIS — K59.00 CONSTIPATION, UNSPECIFIED CONSTIPATION TYPE: ICD-10-CM

## 2025-06-09 DIAGNOSIS — K59.01 SLOW TRANSIT CONSTIPATION: ICD-10-CM

## 2025-06-09 DIAGNOSIS — K59.04 CHRONIC IDIOPATHIC CONSTIPATION: Primary | ICD-10-CM

## 2025-06-09 PROCEDURE — 74018 RADEX ABDOMEN 1 VIEW: CPT

## 2025-06-09 PROCEDURE — 74018 RADEX ABDOMEN 1 VIEW: CPT | Performed by: RADIOLOGY

## 2025-06-11 DIAGNOSIS — E87.1 HYPONATREMIA: ICD-10-CM

## 2025-06-11 DIAGNOSIS — H04.129 DRY EYE SYNDROME OF UNSPECIFIED LACRIMAL GLAND: ICD-10-CM

## 2025-06-11 RX ORDER — SODIUM CHLORIDE 1000 MG
TABLET, SOLUBLE MISCELLANEOUS
Qty: 180 TABLET | Refills: 11 | Status: SHIPPED | OUTPATIENT
Start: 2025-06-11

## 2025-06-17 ENCOUNTER — TELEPHONE (OUTPATIENT)
Dept: GASTROENTEROLOGY | Facility: CLINIC | Age: 85
End: 2025-06-17
Payer: MEDICARE

## 2025-06-17 NOTE — TELEPHONE ENCOUNTER
I tried again to reach the patient and left another voicemail.  I tried yesterday and I tried today.  I left a message to call us if he really needs to talk to me to help with his GI issue.    ----- Message from Kayla CRAWFORD sent at 6/16/2025 10:18 AM EDT -----  Regarding: call  Still having same problems and asking to speak to you.    [phone]

## 2025-06-25 ENCOUNTER — TELEPHONE (OUTPATIENT)
Dept: GASTROENTEROLOGY | Facility: CLINIC | Age: 85
End: 2025-06-25
Payer: MEDICARE

## 2025-06-25 ENCOUNTER — TELEPHONE (OUTPATIENT)
Dept: PRIMARY CARE | Facility: CLINIC | Age: 85
End: 2025-06-25
Payer: MEDICARE

## 2025-06-25 DIAGNOSIS — K59.04 CHRONIC IDIOPATHIC CONSTIPATION: Primary | ICD-10-CM

## 2025-06-26 ENCOUNTER — APPOINTMENT (OUTPATIENT)
Dept: PRIMARY CARE | Facility: CLINIC | Age: 85
End: 2025-06-26
Payer: MEDICARE

## 2025-06-26 ENCOUNTER — HOSPITAL ENCOUNTER (OUTPATIENT)
Dept: RADIOLOGY | Facility: HOSPITAL | Age: 85
Discharge: HOME | End: 2025-06-26
Payer: MEDICARE

## 2025-06-26 DIAGNOSIS — K59.01 SLOW TRANSIT CONSTIPATION: Primary | ICD-10-CM

## 2025-06-26 DIAGNOSIS — K59.01 SLOW TRANSIT CONSTIPATION: ICD-10-CM

## 2025-06-26 PROCEDURE — 74018 RADEX ABDOMEN 1 VIEW: CPT | Performed by: RADIOLOGY

## 2025-06-26 PROCEDURE — 74018 RADEX ABDOMEN 1 VIEW: CPT

## 2025-06-26 RX ORDER — PRUCALOPRIDE 2 MG/1
2 TABLET ORAL DAILY
Qty: 30 TABLET | Refills: 2 | Status: SHIPPED | OUTPATIENT
Start: 2025-06-26

## 2025-06-26 NOTE — TELEPHONE ENCOUNTER
I have talked to him and ordered a x-ray KUB to see how much stool burden there is and then decide on next step.

## 2025-06-28 NOTE — RESULT ENCOUNTER NOTE
Hi Mr. Ren,   The X ray does not show significant stool burden. I would recommended continuing with your current laxative regimen and keep your appointment in 2 days.   Sincerely,

## 2025-06-30 ENCOUNTER — APPOINTMENT (OUTPATIENT)
Dept: GASTROENTEROLOGY | Facility: CLINIC | Age: 85
End: 2025-06-30
Payer: MEDICARE

## 2025-06-30 DIAGNOSIS — K59.04 CHRONIC IDIOPATHIC CONSTIPATION: ICD-10-CM

## 2025-06-30 DIAGNOSIS — R10.9 ABDOMINAL DISCOMFORT: Primary | ICD-10-CM

## 2025-06-30 PROCEDURE — 99214 OFFICE O/P EST MOD 30 MIN: CPT | Performed by: INTERNAL MEDICINE

## 2025-06-30 PROCEDURE — 1036F TOBACCO NON-USER: CPT | Performed by: INTERNAL MEDICINE

## 2025-06-30 RX ORDER — HYOSCYAMINE SULFATE 0.12 MG/1
0.12 TABLET, ORALLY DISINTEGRATING ORAL 2 TIMES DAILY PRN
Qty: 60 TABLET | Refills: 2 | Status: SHIPPED | OUTPATIENT
Start: 2025-06-30 | End: 2026-06-30

## 2025-06-30 ASSESSMENT — ENCOUNTER SYMPTOMS
RESPIRATORY NEGATIVE: 1
NEUROLOGICAL NEGATIVE: 1
CONSTITUTIONAL NEGATIVE: 1
CARDIOVASCULAR NEGATIVE: 1
ENDOCRINE NEGATIVE: 1
CONSTIPATION: 1

## 2025-06-30 NOTE — PROGRESS NOTES
"Subjective   Patient ID: Lucita Ren is a 84 y.o. male who presents for No chief complaint on file..  HPI  This is a virtual visit follow-up.    Abdominal discomfort  Constipation    He had X ray : That was his worse day.   He did an ayurvedic laxative >> which caused him symptoms, and now he is feeling better.   He was seen by surgery.  Taking trulance 3mg oral daily. He is taking senna 1 in the monring and 2 in the night.   Miralax 1 dose in the morning.     He takes some fruits after 1/2 an hour.   Bowel movement 1 every 1 to 2 days          Medications Ordered Prior to Encounter[1]     Review of Systems   Constitutional: Negative.    Respiratory: Negative.     Cardiovascular: Negative.    Gastrointestinal:  Positive for constipation.   Endocrine: Negative.    Genitourinary: Negative.    Skin: Negative.    Neurological: Negative.        Objective   Physical Exam  This is a virtual visit  There were no vitals taken for this visit.     Lab Results   Component Value Date    WBC 5.9 04/02/2025    HGB 13.2 (L) 04/02/2025    HCT 41.9 04/02/2025    MCV 94 04/02/2025     (L) 04/02/2025           No lab exists for component: \"LABALBU\"    No results found for: \"AFP\"  Lab Results   Component Value Date    TSH 0.23 (L) 04/02/2025         Assessment/Plan   Based on the chronicity of his symptoms nonresponsiveness to multiple laxative regiment, abdominal x-ray do not show significant stool burden.  I suspect this is visceral hypersensitivity along with irritable bowel syndrome.  Management will be directed towards symptom control.  Patient is agreeable with the plan.    Diagnoses and all orders for this visit:  Abdominal discomfort  -     hyoscyamine (Anaspaz) 0.125 mg disintegrating tablet; Dissolve 1 tablet (0.125 mg) in the mouth 2 times a day as needed (abdominal discomfort). Dissolve tab under the tongue  Chronic idiopathic constipation  Continue Trulance 1 tablet oral daily 3 mg.  Discussed dietary " modification and guidance towards low FODMAP diet.  He will keep a diary of his symptoms.  Follow-up visit in 3 weeks.                [1]   Current Outpatient Medications on File Prior to Visit   Medication Sig Dispense Refill    amLODIPine (Norvasc) 10 mg tablet TAKE 1 TABLET BY MOUTH EVERY DAY 90 tablet 3    atorvastatin (Lipitor) 40 mg tablet TAKE 1 TABLET BY MOUTH EVERYDAY AT BEDTIME 90 tablet 3    bisacodyl (Dulcolax) 5 mg EC tablet Take 2 tablets (10 mg) by mouth once daily as needed for constipation. Do not crush, chew, or split. 60 tablet 2    cholecalciferol, vitamin D3, 250 mcg (10,000 unit) tablet Take 1 tablet (250 mcg) by mouth once daily.      clopidogrel (Plavix) 75 mg tablet TAKE 1 TABLET BY MOUTH EVERY DAY 90 tablet 3    cyanocobalamin (Vitamin B-12) 1,000 mcg tablet Take 1 tablet (1,000 mcg) by mouth once daily.      famotidine (Pepcid) 20 mg tablet Take 1 tablet (20 mg) by mouth once daily.      furosemide (Lasix) 20 mg tablet TAKE 1 TABLET BY MOUTH EVERY DAY 90 tablet 0    lidocaine HCl-hydrocortison ac 3-0.5 % cream Insert 0.25 g into the rectum 2 times a day. 30 g 3    prucalopride 2 mg tablet Take 1 tablet (2 mg) by mouth once daily. 30 tablet 2    sennosides (senna) 8.6 mg capsule Take by mouth.      sodium chloride 1,000 mg tablet TAKE 3 TABLETS BY MOUTH 2 TIMES A DAY. 180 tablet 11    Synthroid 112 mcg tablet TAKE 1 TABLET BY MOUTH ONCE DAILY. 90 tablet 3    valsartan (Diovan) 80 mg tablet Take 1 tablet (80 mg) by mouth once daily. 90 tablet 3    [DISCONTINUED] aspirin-calcium carbonate 81 mg-300 mg calcium(777 mg) tablet Take 1 tablet by mouth once daily.      [DISCONTINUED] lubiprostone (Amitiza) 24 mcg capsule Take 1 capsule (24 mcg) by mouth 2 times daily (morning and late afternoon). Take with meals 60 capsule 11    [DISCONTINUED] plecanatide (Trulance) tablet tablet Take 1 tablet (3 mg) by mouth once daily. 30 tablet 2     Current Facility-Administered Medications on File Prior to  Visit   Medication Dose Route Frequency Provider Last Rate Last Admin    ciprofloxacin (Cipro) tablet 500 mg  500 mg oral Once Ezekiel Schultz MD [facility]

## 2025-07-14 ENCOUNTER — TELEPHONE (OUTPATIENT)
Dept: PRIMARY CARE | Facility: CLINIC | Age: 85
End: 2025-07-14
Payer: MEDICARE

## 2025-07-14 DIAGNOSIS — E87.1 HYPONATREMIA: Primary | ICD-10-CM

## 2025-07-14 NOTE — TELEPHONE ENCOUNTER
She is asking for lab orders for him.  He just had a full panel of labs in April. Would you like him to have labs done?

## 2025-07-22 LAB
ANION GAP SERPL CALCULATED.4IONS-SCNC: 8 MMOL/L (CALC) (ref 7–17)
BASOPHILS # BLD AUTO: 29 CELLS/UL (ref 0–200)
BASOPHILS NFR BLD AUTO: 0.6 %
BUN SERPL-MCNC: 9 MG/DL (ref 7–25)
BUN/CREAT SERPL: ABNORMAL (CALC) (ref 6–22)
CALCIUM SERPL-MCNC: 9 MG/DL (ref 8.6–10.3)
CHLORIDE SERPL-SCNC: 99 MMOL/L (ref 98–110)
CO2 SERPL-SCNC: 25 MMOL/L (ref 20–32)
CREAT SERPL-MCNC: 0.97 MG/DL (ref 0.7–1.22)
EGFRCR SERPLBLD CKD-EPI 2021: 77 ML/MIN/1.73M2
EOSINOPHIL # BLD AUTO: 29 CELLS/UL (ref 15–500)
EOSINOPHIL NFR BLD AUTO: 0.6 %
ERYTHROCYTE [DISTWIDTH] IN BLOOD BY AUTOMATED COUNT: 13.3 % (ref 11–15)
GLUCOSE SERPL-MCNC: 90 MG/DL (ref 65–99)
HCT VFR BLD AUTO: 41.8 % (ref 38.5–50)
HGB BLD-MCNC: 13.3 G/DL (ref 13.2–17.1)
LYMPHOCYTES # BLD AUTO: 784 CELLS/UL (ref 850–3900)
LYMPHOCYTES NFR BLD AUTO: 16 %
MCH RBC QN AUTO: 30.4 PG (ref 27–33)
MCHC RBC AUTO-ENTMCNC: 31.8 G/DL (ref 32–36)
MCV RBC AUTO: 95.4 FL (ref 80–100)
MONOCYTES # BLD AUTO: 510 CELLS/UL (ref 200–950)
MONOCYTES NFR BLD AUTO: 10.4 %
NEUTROPHILS # BLD AUTO: 3548 CELLS/UL (ref 1500–7800)
NEUTROPHILS NFR BLD AUTO: 72.4 %
PLATELET # BLD AUTO: 137 THOUSAND/UL (ref 140–400)
PMV BLD REES-ECKER: 13.2 FL (ref 7.5–12.5)
POTASSIUM SERPL-SCNC: 4.5 MMOL/L (ref 3.5–5.3)
RBC # BLD AUTO: 4.38 MILLION/UL (ref 4.2–5.8)
SODIUM SERPL-SCNC: 132 MMOL/L (ref 135–146)
TSH SERPL-ACNC: 0.87 MIU/L (ref 0.4–4.5)
WBC # BLD AUTO: 4.9 THOUSAND/UL (ref 3.8–10.8)

## 2025-08-01 DIAGNOSIS — R10.9 ABDOMINAL DISCOMFORT: ICD-10-CM

## 2025-08-01 RX ORDER — HYOSCYAMINE SULFATE 0.12 MG/1
TABLET, ORALLY DISINTEGRATING ORAL
Qty: 180 TABLET | Refills: 1 | Status: SHIPPED | OUTPATIENT
Start: 2025-08-01

## 2025-08-07 DIAGNOSIS — I10 ESSENTIAL (PRIMARY) HYPERTENSION: ICD-10-CM

## 2025-08-07 DIAGNOSIS — I63.9 CEREBRAL INFARCTION, UNSPECIFIED: ICD-10-CM

## 2025-08-07 DIAGNOSIS — E87.1 HYPONATREMIA: ICD-10-CM

## 2025-08-07 DIAGNOSIS — H04.129 DRY EYE SYNDROME OF UNSPECIFIED LACRIMAL GLAND: ICD-10-CM

## 2025-08-07 RX ORDER — AMLODIPINE BESYLATE 10 MG/1
10 TABLET ORAL DAILY
Qty: 90 TABLET | Refills: 3 | Status: SHIPPED | OUTPATIENT
Start: 2025-08-07

## 2025-08-07 RX ORDER — ATORVASTATIN CALCIUM 40 MG/1
40 TABLET, FILM COATED ORAL NIGHTLY
Qty: 90 TABLET | Refills: 3 | Status: SHIPPED | OUTPATIENT
Start: 2025-08-07

## 2025-08-07 RX ORDER — CLOPIDOGREL BISULFATE 75 MG/1
75 TABLET ORAL DAILY
Qty: 90 TABLET | Refills: 3 | Status: SHIPPED | OUTPATIENT
Start: 2025-08-07

## 2025-08-07 RX ORDER — VALSARTAN 80 MG/1
80 TABLET ORAL DAILY
Qty: 90 TABLET | Refills: 3 | Status: SHIPPED | OUTPATIENT
Start: 2025-08-07

## 2025-08-26 ENCOUNTER — TELEPHONE (OUTPATIENT)
Dept: PRIMARY CARE | Facility: CLINIC | Age: 85
End: 2025-08-26
Payer: MEDICARE

## 2025-08-26 DIAGNOSIS — K59.00 CONSTIPATION, UNSPECIFIED CONSTIPATION TYPE: Primary | ICD-10-CM

## 2025-08-26 DIAGNOSIS — K59.04 CHRONIC IDIOPATHIC CONSTIPATION: Primary | ICD-10-CM

## 2025-08-26 RX ORDER — POLYETHYLENE GLYCOL 3350, SODIUM SULFATE ANHYDROUS, SODIUM BICARBONATE, SODIUM CHLORIDE, POTASSIUM CHLORIDE 236; 22.74; 6.74; 5.86; 2.97 G/4L; G/4L; G/4L; G/4L; G/4L
POWDER, FOR SOLUTION ORAL
Qty: 4000 ML | Refills: 0 | Status: SHIPPED | OUTPATIENT
Start: 2025-08-26 | End: 2025-08-27

## 2025-08-26 RX ORDER — POLYETHYLENE GLYCOL 3350, SODIUM SULFATE ANHYDROUS, SODIUM BICARBONATE, SODIUM CHLORIDE, POTASSIUM CHLORIDE 236; 22.74; 6.74; 5.86; 2.97 G/4L; G/4L; G/4L; G/4L; G/4L
4 POWDER, FOR SOLUTION ORAL ONCE
Qty: 4000 ML | Refills: 0 | Status: SHIPPED | OUTPATIENT
Start: 2025-08-26 | End: 2025-08-26